# Patient Record
Sex: FEMALE | Race: WHITE | NOT HISPANIC OR LATINO | Employment: UNEMPLOYED | URBAN - NONMETROPOLITAN AREA
[De-identification: names, ages, dates, MRNs, and addresses within clinical notes are randomized per-mention and may not be internally consistent; named-entity substitution may affect disease eponyms.]

---

## 2024-09-04 ENCOUNTER — HOSPITAL ENCOUNTER (EMERGENCY)
Facility: HOSPITAL | Age: 64
Discharge: HOME | End: 2024-09-04
Payer: MEDICARE

## 2024-09-04 ENCOUNTER — APPOINTMENT (OUTPATIENT)
Dept: RADIOLOGY | Facility: HOSPITAL | Age: 64
End: 2024-09-04
Payer: MEDICARE

## 2024-09-04 ENCOUNTER — OFFICE VISIT (OUTPATIENT)
Dept: URGENT CARE | Facility: CLINIC | Age: 64
End: 2024-09-04
Payer: MEDICARE

## 2024-09-04 VITALS
RESPIRATION RATE: 18 BRPM | BODY MASS INDEX: 32.47 KG/M2 | TEMPERATURE: 98.6 F | HEART RATE: 74 BPM | HEIGHT: 61 IN | OXYGEN SATURATION: 95 % | SYSTOLIC BLOOD PRESSURE: 147 MMHG | DIASTOLIC BLOOD PRESSURE: 89 MMHG | WEIGHT: 172 LBS

## 2024-09-04 VITALS
HEART RATE: 76 BPM | WEIGHT: 172 LBS | OXYGEN SATURATION: 96 % | HEIGHT: 61 IN | SYSTOLIC BLOOD PRESSURE: 125 MMHG | BODY MASS INDEX: 32.47 KG/M2 | DIASTOLIC BLOOD PRESSURE: 73 MMHG | RESPIRATION RATE: 14 BRPM | TEMPERATURE: 98.1 F

## 2024-09-04 DIAGNOSIS — L03.116 CELLULITIS OF LEFT LOWER EXTREMITY: Primary | ICD-10-CM

## 2024-09-04 LAB — D DIMER PPP FEU-MCNC: 598 NG/ML FEU

## 2024-09-04 PROCEDURE — 99283 EMERGENCY DEPT VISIT LOW MDM: CPT

## 2024-09-04 PROCEDURE — 85379 FIBRIN DEGRADATION QUANT: CPT | Performed by: NURSE PRACTITIONER

## 2024-09-04 PROCEDURE — 73590 X-RAY EXAM OF LOWER LEG: CPT | Mod: LT

## 2024-09-04 PROCEDURE — 73590 X-RAY EXAM OF LOWER LEG: CPT | Mod: LEFT SIDE | Performed by: RADIOLOGY

## 2024-09-04 PROCEDURE — 36415 COLL VENOUS BLD VENIPUNCTURE: CPT | Performed by: NURSE PRACTITIONER

## 2024-09-04 RX ORDER — BISMUTH SUBSALICYLATE 262 MG
1 TABLET,CHEWABLE ORAL DAILY
COMMUNITY

## 2024-09-04 RX ORDER — CLINDAMYCIN HYDROCHLORIDE 300 MG/1
300 CAPSULE ORAL 4 TIMES DAILY
Qty: 28 CAPSULE | Refills: 0 | Status: SHIPPED | OUTPATIENT
Start: 2024-09-04 | End: 2024-09-11

## 2024-09-04 ASSESSMENT — PAIN - FUNCTIONAL ASSESSMENT: PAIN_FUNCTIONAL_ASSESSMENT: 0-10

## 2024-09-04 ASSESSMENT — COLUMBIA-SUICIDE SEVERITY RATING SCALE - C-SSRS
6. HAVE YOU EVER DONE ANYTHING, STARTED TO DO ANYTHING, OR PREPARED TO DO ANYTHING TO END YOUR LIFE?: NO
2. HAVE YOU ACTUALLY HAD ANY THOUGHTS OF KILLING YOURSELF?: NO
1. IN THE PAST MONTH, HAVE YOU WISHED YOU WERE DEAD OR WISHED YOU COULD GO TO SLEEP AND NOT WAKE UP?: NO

## 2024-09-04 ASSESSMENT — PAIN DESCRIPTION - LOCATION: LOCATION: LEG

## 2024-09-04 ASSESSMENT — PAIN DESCRIPTION - PAIN TYPE: TYPE: ACUTE PAIN

## 2024-09-04 ASSESSMENT — PAIN DESCRIPTION - ORIENTATION: ORIENTATION: LEFT;LOWER

## 2024-09-04 ASSESSMENT — PAIN SCALES - GENERAL: PAINLEVEL_OUTOF10: 5 - MODERATE PAIN

## 2024-09-04 NOTE — ED PROVIDER NOTES
Chief Complaint   Patient presents with    Leg Swelling     Female came to ED by POV with left lower leg swelling, she has pain when ambulating, 5/10. Came from the urgent care. Approximately 1 month ago she hit her leg with tire iron and about 3 days ago her left lower leg started swelling and having a lot of pain when ambulating. Upon exam it is swollen, red and warm to the touch.         Patient History    No past medical history on file.   No past surgical history on file.   No family history on file.   Social History     Social History Narrative    Not on file      No Known Allergies     PMH: Reviewed  PSH: Reviewed  Social History: Reviewed.   Allergies reviewed.     HPI: Belgica Pinto is a 64 y.o. female who presents to the ED today accompanied by her granddaughter with complaints of left leg pain and swelling.  About a month ago she struck that front of her right lower extremity with a tire iron.  It swelled initially but then went down.  Over the last 4 days she has not had increased pain and swelling along with redness.  She went to urgent care and was advised to come to the ED today.  Denies fevers or chills.  Denies chest pain or shortness of breath.  No history of PE or DVT.    PHYSICAL EXAM:    GENERAL: Vitals noted, no distress. Alert and oriented x 3. Non-toxic.       HEAD: Normocephalic, atraumatic. Pupils equally round and reactive to light. EOMI.     NECK: Supple. No midline or paraspinal tenderness through full range of motion.      CARDIAC: Regular rate, rhythm. No murmurs or rubs.    RESPIRATORY: Lungs clear and equal bilaterally. No respiratory distress.     MUSCULOSKELETAL & SKIN:  Warm, dry, and intact set for the patient's left lower extremity.  It is red, warm to the touch, localized swelling.  Distal cap refills less than 2 seconds.  Pedal pulse 2+ and bounding.  Knee with full range of motion and nontender. No rash/lesions. No other peripheral edema.     NEURO: No focal neurologic  deficits, acting appropriately.     Labs Reviewed   D-DIMER, NON VTE - Abnormal       Result Value    D-Dimer Non VTE, Quant (ng/mL FEU) 598 (*)     Narrative:     The D-Dimer assay is reported in ng/mL Fibrinogen Equivalent Units (FEU). The results of this assay should NOT be used for the exclusion of Deep Vein Thrombosis and/or Pulmonary Embolism.        XR tibia fibula left 2 views   Final Result   Focal soft tissue swelling in the anterior proximal calf        Signed by: Anastacia Chapa 9/4/2024 6:59 PM   Dictation workstation:   [id number]           Medical Decision Making         ED COURSE: This patient was seen and examined by myself independently.  Concern for DVT, however unfortunately ultrasound is not available at this time.  Patient has a D-dimer which is noted above at 598, negative for her age of 64.  X-ray of the left tib-fib shows focal soft tissue swelling the anterior proximal calf.  Given the redness and the swelling and the tenderness the patient will be started on antibiotics for acute cellulitis.  Referred to primary care physician for repeat evaluation follow-up care in the next 2 to 3 days.  Return to ED for any new or worsening symptoms.  Discharged home in a stable condition with computer instructions given.      DIAGNOSTIC IMPRESSION: #1 cellulitis left lower extremity     Riassa Dillard, RASHAUN-CNP  09/04/24 1930

## 2024-09-11 ENCOUNTER — APPOINTMENT (OUTPATIENT)
Dept: RADIOLOGY | Facility: HOSPITAL | Age: 64
DRG: 572 | End: 2024-09-11
Payer: MEDICARE

## 2024-09-11 ENCOUNTER — CLINICAL SUPPORT (OUTPATIENT)
Dept: URGENT CARE | Facility: CLINIC | Age: 64
End: 2024-09-11
Payer: MEDICARE

## 2024-09-11 ENCOUNTER — HOSPITAL ENCOUNTER (INPATIENT)
Facility: HOSPITAL | Age: 64
DRG: 572 | End: 2024-09-11
Attending: EMERGENCY MEDICINE | Admitting: SPECIALIST
Payer: MEDICARE

## 2024-09-11 ENCOUNTER — PREP FOR PROCEDURE (OUTPATIENT)
Dept: ORTHOPEDIC SURGERY | Facility: CLINIC | Age: 64
End: 2024-09-11

## 2024-09-11 ENCOUNTER — APPOINTMENT (OUTPATIENT)
Dept: VASCULAR MEDICINE | Facility: HOSPITAL | Age: 64
DRG: 572 | End: 2024-09-11
Payer: MEDICARE

## 2024-09-11 VITALS
HEIGHT: 61 IN | RESPIRATION RATE: 18 BRPM | HEART RATE: 67 BPM | SYSTOLIC BLOOD PRESSURE: 133 MMHG | BODY MASS INDEX: 32.47 KG/M2 | OXYGEN SATURATION: 97 % | DIASTOLIC BLOOD PRESSURE: 83 MMHG | WEIGHT: 172 LBS | TEMPERATURE: 97.4 F

## 2024-09-11 DIAGNOSIS — L03.116 CELLULITIS AND ABSCESS OF LEFT LOWER EXTREMITY: ICD-10-CM

## 2024-09-11 DIAGNOSIS — L02.416 ABSCESS OF LEFT LEG: Primary | ICD-10-CM

## 2024-09-11 DIAGNOSIS — M79.662 PAIN IN LEFT LOWER LEG: ICD-10-CM

## 2024-09-11 DIAGNOSIS — L02.416 CELLULITIS AND ABSCESS OF LEFT LOWER EXTREMITY: ICD-10-CM

## 2024-09-11 LAB
ALBUMIN SERPL BCP-MCNC: 3.9 G/DL (ref 3.4–5)
ALP SERPL-CCNC: 85 U/L (ref 33–136)
ALT SERPL W P-5'-P-CCNC: 20 U/L (ref 7–45)
ANION GAP SERPL CALC-SCNC: 12 MMOL/L (ref 10–20)
AST SERPL W P-5'-P-CCNC: 14 U/L (ref 9–39)
BASOPHILS # BLD AUTO: 0.1 X10*3/UL (ref 0–0.1)
BASOPHILS NFR BLD AUTO: 0.9 %
BILIRUB SERPL-MCNC: 0.5 MG/DL (ref 0–1.2)
BUN SERPL-MCNC: 11 MG/DL (ref 6–23)
CALCIUM SERPL-MCNC: 9.2 MG/DL (ref 8.6–10.3)
CHLORIDE SERPL-SCNC: 100 MMOL/L (ref 98–107)
CO2 SERPL-SCNC: 28 MMOL/L (ref 21–32)
CREAT SERPL-MCNC: 0.63 MG/DL (ref 0.5–1.05)
CRP SERPL-MCNC: 8.88 MG/DL
EGFRCR SERPLBLD CKD-EPI 2021: >90 ML/MIN/1.73M*2
EOSINOPHIL # BLD AUTO: 0.35 X10*3/UL (ref 0–0.7)
EOSINOPHIL NFR BLD AUTO: 3.1 %
ERYTHROCYTE [DISTWIDTH] IN BLOOD BY AUTOMATED COUNT: 12.8 % (ref 11.5–14.5)
ERYTHROCYTE [SEDIMENTATION RATE] IN BLOOD BY WESTERGREN METHOD: 79 MM/H (ref 0–30)
GLUCOSE SERPL-MCNC: 116 MG/DL (ref 74–99)
HCT VFR BLD AUTO: 38.1 % (ref 36–46)
HGB BLD-MCNC: 12.4 G/DL (ref 12–16)
IMM GRANULOCYTES # BLD AUTO: 0.23 X10*3/UL (ref 0–0.7)
IMM GRANULOCYTES NFR BLD AUTO: 2 % (ref 0–0.9)
LACTATE SERPL-SCNC: 0.6 MMOL/L (ref 0.4–2)
LYMPHOCYTES # BLD AUTO: 1.96 X10*3/UL (ref 1.2–4.8)
LYMPHOCYTES NFR BLD AUTO: 17.2 %
MCH RBC QN AUTO: 29.2 PG (ref 26–34)
MCHC RBC AUTO-ENTMCNC: 32.5 G/DL (ref 32–36)
MCV RBC AUTO: 90 FL (ref 80–100)
MONOCYTES # BLD AUTO: 1.05 X10*3/UL (ref 0.1–1)
MONOCYTES NFR BLD AUTO: 9.2 %
NEUTROPHILS # BLD AUTO: 7.68 X10*3/UL (ref 1.2–7.7)
NEUTROPHILS NFR BLD AUTO: 67.6 %
NRBC BLD-RTO: 0 /100 WBCS (ref 0–0)
PLATELET # BLD AUTO: 296 X10*3/UL (ref 150–450)
POTASSIUM SERPL-SCNC: 4 MMOL/L (ref 3.5–5.3)
PROT SERPL-MCNC: 7.5 G/DL (ref 6.4–8.2)
RBC # BLD AUTO: 4.25 X10*6/UL (ref 4–5.2)
SODIUM SERPL-SCNC: 136 MMOL/L (ref 136–145)
WBC # BLD AUTO: 11.4 X10*3/UL (ref 4.4–11.3)

## 2024-09-11 PROCEDURE — 2500000004 HC RX 250 GENERAL PHARMACY W/ HCPCS (ALT 636 FOR OP/ED): Performed by: HOSPITALIST

## 2024-09-11 PROCEDURE — 1100000001 HC PRIVATE ROOM DAILY

## 2024-09-11 PROCEDURE — 93971 EXTREMITY STUDY: CPT | Performed by: STUDENT IN AN ORGANIZED HEALTH CARE EDUCATION/TRAINING PROGRAM

## 2024-09-11 PROCEDURE — 93971 EXTREMITY STUDY: CPT

## 2024-09-11 PROCEDURE — 82565 ASSAY OF CREATININE: CPT | Performed by: EMERGENCY MEDICINE

## 2024-09-11 PROCEDURE — 85652 RBC SED RATE AUTOMATED: CPT | Performed by: EMERGENCY MEDICINE

## 2024-09-11 PROCEDURE — 86140 C-REACTIVE PROTEIN: CPT | Performed by: EMERGENCY MEDICINE

## 2024-09-11 PROCEDURE — 36415 COLL VENOUS BLD VENIPUNCTURE: CPT | Performed by: EMERGENCY MEDICINE

## 2024-09-11 PROCEDURE — 2500000004 HC RX 250 GENERAL PHARMACY W/ HCPCS (ALT 636 FOR OP/ED): Performed by: EMERGENCY MEDICINE

## 2024-09-11 PROCEDURE — 87040 BLOOD CULTURE FOR BACTERIA: CPT | Mod: SAMLAB | Performed by: EMERGENCY MEDICINE

## 2024-09-11 PROCEDURE — 96367 TX/PROPH/DG ADDL SEQ IV INF: CPT

## 2024-09-11 PROCEDURE — 83605 ASSAY OF LACTIC ACID: CPT | Performed by: EMERGENCY MEDICINE

## 2024-09-11 PROCEDURE — 85025 COMPLETE CBC W/AUTO DIFF WBC: CPT | Performed by: EMERGENCY MEDICINE

## 2024-09-11 PROCEDURE — 99223 1ST HOSP IP/OBS HIGH 75: CPT | Performed by: SPECIALIST

## 2024-09-11 PROCEDURE — 99222 1ST HOSP IP/OBS MODERATE 55: CPT | Performed by: HOSPITALIST

## 2024-09-11 PROCEDURE — 73701 CT LOWER EXTREMITY W/DYE: CPT | Mod: LEFT SIDE | Performed by: RADIOLOGY

## 2024-09-11 PROCEDURE — 2550000001 HC RX 255 CONTRASTS: Performed by: EMERGENCY MEDICINE

## 2024-09-11 PROCEDURE — 2500000001 HC RX 250 WO HCPCS SELF ADMINISTERED DRUGS (ALT 637 FOR MEDICARE OP): Performed by: HOSPITALIST

## 2024-09-11 PROCEDURE — 96365 THER/PROPH/DIAG IV INF INIT: CPT

## 2024-09-11 PROCEDURE — 96366 THER/PROPH/DIAG IV INF ADDON: CPT

## 2024-09-11 PROCEDURE — 73701 CT LOWER EXTREMITY W/DYE: CPT | Mod: LT

## 2024-09-11 PROCEDURE — 99285 EMERGENCY DEPT VISIT HI MDM: CPT | Mod: 25

## 2024-09-11 RX ORDER — BACITRACIN ZINC 500 UNIT/G
OINTMENT IN PACKET (EA) TOPICAL 3 TIMES DAILY
Status: DISCONTINUED | OUTPATIENT
Start: 2024-09-11 | End: 2024-09-13

## 2024-09-11 RX ORDER — ONDANSETRON 4 MG/1
4 TABLET, ORALLY DISINTEGRATING ORAL EVERY 8 HOURS PRN
Status: DISCONTINUED | OUTPATIENT
Start: 2024-09-11 | End: 2024-09-17 | Stop reason: HOSPADM

## 2024-09-11 RX ORDER — VANCOMYCIN HYDROCHLORIDE 1 G/20ML
INJECTION, POWDER, LYOPHILIZED, FOR SOLUTION INTRAVENOUS DAILY PRN
Status: DISCONTINUED | OUTPATIENT
Start: 2024-09-11 | End: 2024-09-17

## 2024-09-11 RX ORDER — POLYETHYLENE GLYCOL 3350 17 G/17G
17 POWDER, FOR SOLUTION ORAL DAILY
Status: DISCONTINUED | OUTPATIENT
Start: 2024-09-11 | End: 2024-09-17 | Stop reason: HOSPADM

## 2024-09-11 RX ORDER — ONDANSETRON HYDROCHLORIDE 2 MG/ML
4 INJECTION, SOLUTION INTRAVENOUS EVERY 8 HOURS PRN
Status: DISCONTINUED | OUTPATIENT
Start: 2024-09-11 | End: 2024-09-17 | Stop reason: HOSPADM

## 2024-09-11 RX ORDER — ACETAMINOPHEN 160 MG/5ML
650 SOLUTION ORAL EVERY 4 HOURS PRN
Status: DISCONTINUED | OUTPATIENT
Start: 2024-09-11 | End: 2024-09-17 | Stop reason: HOSPADM

## 2024-09-11 RX ORDER — VANCOMYCIN 1.75 G/350ML
1250 INJECTION, SOLUTION INTRAVENOUS EVERY 12 HOURS
Status: DISCONTINUED | OUTPATIENT
Start: 2024-09-12 | End: 2024-09-12

## 2024-09-11 RX ORDER — MORPHINE SULFATE 2 MG/ML
0.5 INJECTION, SOLUTION INTRAMUSCULAR; INTRAVENOUS EVERY 4 HOURS PRN
Status: DISCONTINUED | OUTPATIENT
Start: 2024-09-11 | End: 2024-09-17 | Stop reason: HOSPADM

## 2024-09-11 RX ORDER — SODIUM CHLORIDE, SODIUM LACTATE, POTASSIUM CHLORIDE, CALCIUM CHLORIDE 600; 310; 30; 20 MG/100ML; MG/100ML; MG/100ML; MG/100ML
20 INJECTION, SOLUTION INTRAVENOUS CONTINUOUS
Status: CANCELLED | OUTPATIENT
Start: 2024-09-11

## 2024-09-11 RX ORDER — ACETAMINOPHEN 650 MG/1
650 SUPPOSITORY RECTAL EVERY 4 HOURS PRN
Status: DISCONTINUED | OUTPATIENT
Start: 2024-09-11 | End: 2024-09-17 | Stop reason: HOSPADM

## 2024-09-11 RX ORDER — ACETAMINOPHEN 325 MG/1
650 TABLET ORAL EVERY 4 HOURS PRN
Status: DISCONTINUED | OUTPATIENT
Start: 2024-09-11 | End: 2024-09-17 | Stop reason: HOSPADM

## 2024-09-11 RX ORDER — VANCOMYCIN 2 GRAM/500 ML IN 0.9 % SODIUM CHLORIDE INTRAVENOUS
2 ONCE
Status: DISCONTINUED | OUTPATIENT
Start: 2024-09-11 | End: 2024-09-11

## 2024-09-11 RX ORDER — SODIUM CHLORIDE 9 MG/ML
150 INJECTION, SOLUTION INTRAVENOUS CONTINUOUS
Status: DISCONTINUED | OUTPATIENT
Start: 2024-09-11 | End: 2024-09-12

## 2024-09-11 RX ORDER — MORPHINE SULFATE 2 MG/ML
1 INJECTION, SOLUTION INTRAMUSCULAR; INTRAVENOUS EVERY 4 HOURS PRN
Status: DISCONTINUED | OUTPATIENT
Start: 2024-09-11 | End: 2024-09-17 | Stop reason: HOSPADM

## 2024-09-11 SDOH — SOCIAL STABILITY: SOCIAL INSECURITY: DO YOU FEEL ANYONE HAS EXPLOITED OR TAKEN ADVANTAGE OF YOU FINANCIALLY OR OF YOUR PERSONAL PROPERTY?: NO

## 2024-09-11 SDOH — SOCIAL STABILITY: SOCIAL INSECURITY: ARE THERE ANY APPARENT SIGNS OF INJURIES/BEHAVIORS THAT COULD BE RELATED TO ABUSE/NEGLECT?: NO

## 2024-09-11 SDOH — SOCIAL STABILITY: SOCIAL INSECURITY: WERE YOU ABLE TO COMPLETE ALL THE BEHAVIORAL HEALTH SCREENINGS?: YES

## 2024-09-11 SDOH — SOCIAL STABILITY: SOCIAL INSECURITY: ABUSE: ADULT

## 2024-09-11 SDOH — SOCIAL STABILITY: SOCIAL INSECURITY: HAVE YOU HAD THOUGHTS OF HARMING ANYONE ELSE?: NO

## 2024-09-11 SDOH — SOCIAL STABILITY: SOCIAL INSECURITY: ARE YOU OR HAVE YOU BEEN THREATENED OR ABUSED PHYSICALLY, EMOTIONALLY, OR SEXUALLY BY ANYONE?: NO

## 2024-09-11 SDOH — SOCIAL STABILITY: SOCIAL INSECURITY: HAVE YOU HAD ANY THOUGHTS OF HARMING ANYONE ELSE?: NO

## 2024-09-11 SDOH — SOCIAL STABILITY: SOCIAL INSECURITY: DO YOU FEEL UNSAFE GOING BACK TO THE PLACE WHERE YOU ARE LIVING?: NO

## 2024-09-11 SDOH — SOCIAL STABILITY: SOCIAL INSECURITY: DOES ANYONE TRY TO KEEP YOU FROM HAVING/CONTACTING OTHER FRIENDS OR DOING THINGS OUTSIDE YOUR HOME?: NO

## 2024-09-11 SDOH — SOCIAL STABILITY: SOCIAL INSECURITY: HAS ANYONE EVER THREATENED TO HURT YOUR FAMILY OR YOUR PETS?: NO

## 2024-09-11 ASSESSMENT — PATIENT HEALTH QUESTIONNAIRE - PHQ9
1. LITTLE INTEREST OR PLEASURE IN DOING THINGS: NOT AT ALL
2. FEELING DOWN, DEPRESSED OR HOPELESS: NOT AT ALL
SUM OF ALL RESPONSES TO PHQ9 QUESTIONS 1 & 2: 0

## 2024-09-11 ASSESSMENT — COGNITIVE AND FUNCTIONAL STATUS - GENERAL
MOBILITY SCORE: 24
DAILY ACTIVITIY SCORE: 24
MOBILITY SCORE: 24
MOBILITY SCORE: 24
DAILY ACTIVITIY SCORE: 24
DAILY ACTIVITIY SCORE: 24
PATIENT BASELINE BEDBOUND: NO

## 2024-09-11 ASSESSMENT — LIFESTYLE VARIABLES
HOW MANY STANDARD DRINKS CONTAINING ALCOHOL DO YOU HAVE ON A TYPICAL DAY: PATIENT DOES NOT DRINK
SUBSTANCE_ABUSE_PAST_12_MONTHS: NO
AUDIT-C TOTAL SCORE: 0
AUDIT-C TOTAL SCORE: 0
HOW OFTEN DO YOU HAVE 6 OR MORE DRINKS ON ONE OCCASION: NEVER
SKIP TO QUESTIONS 9-10: 1
PRESCIPTION_ABUSE_PAST_12_MONTHS: NO
HOW OFTEN DO YOU HAVE A DRINK CONTAINING ALCOHOL: NEVER

## 2024-09-11 ASSESSMENT — COLUMBIA-SUICIDE SEVERITY RATING SCALE - C-SSRS
2. HAVE YOU ACTUALLY HAD ANY THOUGHTS OF KILLING YOURSELF?: NO
1. IN THE PAST MONTH, HAVE YOU WISHED YOU WERE DEAD OR WISHED YOU COULD GO TO SLEEP AND NOT WAKE UP?: NO
2. HAVE YOU ACTUALLY HAD ANY THOUGHTS OF KILLING YOURSELF?: NO
6. HAVE YOU EVER DONE ANYTHING, STARTED TO DO ANYTHING, OR PREPARED TO DO ANYTHING TO END YOUR LIFE?: NO
1. IN THE PAST MONTH, HAVE YOU WISHED YOU WERE DEAD OR WISHED YOU COULD GO TO SLEEP AND NOT WAKE UP?: NO
6. HAVE YOU EVER DONE ANYTHING, STARTED TO DO ANYTHING, OR PREPARED TO DO ANYTHING TO END YOUR LIFE?: NO

## 2024-09-11 ASSESSMENT — PAIN SCALES - GENERAL
PAINLEVEL_OUTOF10: 5 - MODERATE PAIN
PAINLEVEL_OUTOF10: 0 - NO PAIN

## 2024-09-11 ASSESSMENT — PAIN - FUNCTIONAL ASSESSMENT
PAIN_FUNCTIONAL_ASSESSMENT: 0-10

## 2024-09-11 ASSESSMENT — ACTIVITIES OF DAILY LIVING (ADL)
HEARING - LEFT EAR: FUNCTIONAL
BATHING: INDEPENDENT
FEEDING YOURSELF: INDEPENDENT
TOILETING: INDEPENDENT
PATIENT'S MEMORY ADEQUATE TO SAFELY COMPLETE DAILY ACTIVITIES?: YES
JUDGMENT_ADEQUATE_SAFELY_COMPLETE_DAILY_ACTIVITIES: YES
WALKS IN HOME: INDEPENDENT
LACK_OF_TRANSPORTATION: NO
HEARING - RIGHT EAR: FUNCTIONAL
DRESSING YOURSELF: INDEPENDENT
ADEQUATE_TO_COMPLETE_ADL: YES
GROOMING: INDEPENDENT

## 2024-09-11 ASSESSMENT — VISUAL ACUITY: OU: 1

## 2024-09-11 NOTE — CARE PLAN
Problem: Skin  Goal: Decreased wound size/increased tissue granulation at next dressing change  Outcome: Progressing  Flowsheets (Taken 9/11/2024 1726)  Decreased wound size/increased tissue granulation at next dressing change: Promote sleep for wound healing  Goal: Participates in plan/prevention/treatment measures  Outcome: Progressing  Flowsheets (Taken 9/11/2024 1726)  Participates in plan/prevention/treatment measures: Increase activity/out of bed for meals  Goal: Promote/optimize nutrition  Outcome: Progressing  Flowsheets (Taken 9/11/2024 1726)  Promote/optimize nutrition: Consume > 50% meals/supplements  Goal: Promote skin healing  Outcome: Progressing  Flowsheets (Taken 9/11/2024 1726)  Promote skin healing: Turn/reposition every 2 hours/use positioning/transfer devices     Problem: Pain - Adult  Goal: Verbalizes/displays adequate comfort level or baseline comfort level  Outcome: Progressing     Problem: Safety - Adult  Goal: Free from fall injury  Outcome: Progressing     Problem: Discharge Planning  Goal: Discharge to home or other facility with appropriate resources  Outcome: Progressing     Problem: Chronic Conditions and Co-morbidities  Goal: Patient's chronic conditions and co-morbidity symptoms are monitored and maintained or improved  Outcome: Progressing     Problem: Pain  Goal: Takes deep breaths with improved pain control throughout the shift  Outcome: Progressing  Goal: Turns in bed with improved pain control throughout the shift  Outcome: Progressing  Goal: Walks with improved pain control throughout the shift  Outcome: Progressing  Goal: Performs ADL's with improved pain control throughout shift  Outcome: Progressing  Goal: Participates in PT with improved pain control throughout the shift  Outcome: Progressing  Goal: Free from opioid side effects throughout the shift  Outcome: Progressing  Goal: Free from acute confusion related to pain meds throughout the shift  Outcome: Progressing   The  patient's goals for the shift include no falls    The clinical goals for the shift include less swelling    Over the shift, the patient did not make progress toward the following goals. Barriers to progression include . Recommendations to address these barriers include .

## 2024-09-11 NOTE — ED PROVIDER NOTES
Infection of left leg.  This 64-year-old white female presents to the ED with complaint of infection of her left leg she states that she just finished a weeks worth of oral clindamycin for treatment of her cellulitis.  Patient does admit to a remote history of traumatic injury to her anterior tibia approxi a month ago with a tire iron.  She states that she then reinjured her leg when going up and down a ladder and noted infection that started Tuesday.  On Wednesday she was seen and evaluated in ED and started on clindamycin.  She states that some of the areas of erythema have improved but she has noted increasing swelling just below the tibial tuberosity across the anterior tibia with pain into the left calf.      History provided by:  Patient   used: No         Physical Exam  Vitals and nursing note reviewed.   Constitutional:       Appearance: Normal appearance. She is overweight.   HENT:      Head: Normocephalic and atraumatic.      Right Ear: Hearing and external ear normal.      Left Ear: Hearing and external ear normal.      Nose: Nose normal. No congestion or rhinorrhea.      Mouth/Throat:      Lips: Pink.      Mouth: Mucous membranes are moist.      Pharynx: Oropharynx is clear. No oropharyngeal exudate or posterior oropharyngeal erythema.   Eyes:      General: Lids are normal. Vision grossly intact.         Right eye: No discharge.         Left eye: No discharge.      Extraocular Movements: Extraocular movements intact.      Conjunctiva/sclera: Conjunctivae normal.      Pupils: Pupils are equal, round, and reactive to light.   Cardiovascular:      Rate and Rhythm: Normal rate and regular rhythm.      Pulses: Normal pulses.      Heart sounds: Normal heart sounds. No murmur heard.     No friction rub. No gallop.   Pulmonary:      Effort: Pulmonary effort is normal. No respiratory distress.      Breath sounds: Normal breath sounds. No stridor. No wheezing, rhonchi or rales.   Chest:       Chest wall: No tenderness.   Abdominal:      General: Abdomen is flat. Bowel sounds are normal. There is no distension.      Palpations: Abdomen is soft. There is no mass.      Tenderness: There is no abdominal tenderness. There is no guarding or rebound.      Hernia: No hernia is present.      Comments: Benign abdominal exam.   Musculoskeletal:         General: No swelling, deformity or signs of injury. Normal range of motion.      Cervical back: Full passive range of motion without pain, normal range of motion and neck supple.      Right lower leg: Normal. No swelling. No edema.      Left lower leg: Swelling and tenderness present. Edema present.      Comments: Evaluation of the left lower extremity revealed distal pulses are +2/4 and present at the dorsalis pedis and tibialis.  Cap refill less than 2 seconds and light touch sensations intact.  Patient does have diffuse erythema of much of the anterior tibia there is some fullness to the soft tissues underneath the skin though no fluctuance is noted.  There is some mild erythema above the knee but the patient states that this is improved.  There is no streaks of erythema appreciated.  Patient does have tenderness of the left calf.   Skin:     General: Skin is warm and dry.      Capillary Refill: Capillary refill takes less than 2 seconds.      Coloration: Skin is not jaundiced or pale.      Findings: Erythema and rash present. No bruising or lesion.   Neurological:      General: No focal deficit present.      Mental Status: She is alert and oriented to person, place, and time.      GCS: GCS eye subscore is 4. GCS verbal subscore is 5. GCS motor subscore is 6.      Cranial Nerves: Cranial nerves 2-12 are intact. No cranial nerve deficit.      Sensory: Sensation is intact. No sensory deficit.      Motor: No weakness.      Coordination: Coordination is intact. Coordination normal.      Deep Tendon Reflexes: Reflexes normal.   Psychiatric:         Attention and  Perception: Attention and perception normal.         Mood and Affect: Mood normal.         Speech: Speech normal.         Behavior: Behavior normal. Behavior is cooperative.         Thought Content: Thought content normal.         Cognition and Memory: Cognition and memory normal.         Judgment: Judgment normal.          Labs Reviewed   TISSUE/WOUND CULTURE/SMEAR - Abnormal       Result Value    Tissue/Wound Culture/Smear   (*)     Value: (4+) Abundant Methicillin Resistant Staphylococcus aureus (MRSA)    Gram Stain (2+) Few Polymorphonuclear leukocytes (*)     Gram Stain (2+) Few Gram positive cocci (*)    TISSUE/WOUND CULTURE/SMEAR - Abnormal    Tissue/Wound Culture/Smear (1+) Rare Staphylococcus aureus (*)     Gram Stain (2+) Few Polymorphonuclear leukocytes      Gram Stain No organisms seen     COMPREHENSIVE METABOLIC PANEL - Abnormal    Glucose 116 (*)     Sodium 136      Potassium 4.0      Chloride 100      Bicarbonate 28      Anion Gap 12      Urea Nitrogen 11      Creatinine 0.63      eGFR >90      Calcium 9.2      Albumin 3.9      Alkaline Phosphatase 85      Total Protein 7.5      AST 14      Bilirubin, Total 0.5      ALT 20     CBC WITH AUTO DIFFERENTIAL - Abnormal    WBC 11.4 (*)     nRBC 0.0      RBC 4.25      Hemoglobin 12.4      Hematocrit 38.1      MCV 90      MCH 29.2      MCHC 32.5      RDW 12.8      Platelets 296      Neutrophils % 67.6      Immature Granulocytes %, Automated 2.0 (*)     Lymphocytes % 17.2      Monocytes % 9.2      Eosinophils % 3.1      Basophils % 0.9      Neutrophils Absolute 7.68      Immature Granulocytes Absolute, Automated 0.23      Lymphocytes Absolute 1.96      Monocytes Absolute 1.05 (*)     Eosinophils Absolute 0.35      Basophils Absolute 0.10     SEDIMENTATION RATE, AUTOMATED - Abnormal    Sedimentation Rate 79 (*)    C-REACTIVE PROTEIN - Abnormal    C-Reactive Protein 8.88 (*)    CBC - Abnormal    WBC 9.0      nRBC 0.0      RBC 3.57 (*)     Hemoglobin 10.5 (*)      Hematocrit 32.5 (*)     MCV 91      MCH 29.4      MCHC 32.3      RDW 12.9      Platelets 277     COMPREHENSIVE METABOLIC PANEL - Abnormal    Glucose 102 (*)     Sodium 139      Potassium 4.0      Chloride 106      Bicarbonate 27      Anion Gap 10      Urea Nitrogen 10      Creatinine 0.70      eGFR >90      Calcium 8.3 (*)     Albumin 3.1 (*)     Alkaline Phosphatase 66      Total Protein 6.1 (*)     AST 10      Bilirubin, Total 0.4      ALT 13     VANCOMYCIN, TROUGH - Abnormal    Vancomycin, Trough 21.7 (*)    VANCOMYCIN - Abnormal    Vancomycin 22.5 (*)     Narrative:     Vancomycin levels can be monitored according to area under the curve (AUC) or concentration (ug/mL). The preferred monitoring strategy is determined by the patient's renal function and indication for therapy.     For AUC monitoring, a random vancomycin level should be interpreted in the context of AUC rather than the concentration at a single point in time.    For concentration monitoring, a trough concentration drawn immediately prior to the next dose is preferred.       Therapeutic ranges using concentration-guided results:  Peak (all ages):                  30.0-40.0 ug/mL  Trough (all ages):                10.0-20.0 ug/mL              CBC - Abnormal    WBC 11.1      nRBC 0.0      RBC 3.87 (*)     Hemoglobin 11.3 (*)     Hematocrit 34.8 (*)     MCV 90      MCH 29.2      MCHC 32.5      RDW 12.9      Platelets 333     BASIC METABOLIC PANEL - Abnormal    Glucose 112 (*)     Sodium 135 (*)     Potassium 3.8      Chloride 103      Bicarbonate 25      Anion Gap 11      Urea Nitrogen 12      Creatinine 0.74      eGFR 90      Calcium 8.6     CBC - Abnormal    WBC 10.5      nRBC 0.0      RBC 3.77 (*)     Hemoglobin 11.1 (*)     Hematocrit 34.4 (*)     MCV 91      MCH 29.4      MCHC 32.3      RDW 13.2      Platelets 311     BASIC METABOLIC PANEL - Abnormal    Glucose 89      Sodium 141      Potassium 3.8      Chloride 107      Bicarbonate 26      Anion  Gap 12      Urea Nitrogen 17      Creatinine 0.88      eGFR 73      Calcium 8.5 (*)    C-REACTIVE PROTEIN - Abnormal    C-Reactive Protein 2.61 (*)    SEDIMENTATION RATE, AUTOMATED - Abnormal    Sedimentation Rate 54 (*)    VANCOMYCIN - Abnormal    Vancomycin 38.3 (*)     Narrative:     Vancomycin levels can be monitored according to area under the curve (AUC) or concentration (ug/mL). The preferred monitoring strategy is determined by the patient's renal function and indication for therapy.     For AUC monitoring, a random vancomycin level should be interpreted in the context of AUC rather than the concentration at a single point in time.    For concentration monitoring, a trough concentration drawn immediately prior to the next dose is preferred.       Therapeutic ranges using concentration-guided results:  Peak (all ages):                  30.0-40.0 ug/mL  Trough (all ages):                10.0-20.0 ug/mL              VANCOMYCIN - Abnormal    Vancomycin 24.7 (*)     Narrative:     Vancomycin levels can be monitored according to area under the curve (AUC) or concentration (ug/mL). The preferred monitoring strategy is determined by the patient's renal function and indication for therapy.     For AUC monitoring, a random vancomycin level should be interpreted in the context of AUC rather than the concentration at a single point in time.    For concentration monitoring, a trough concentration drawn immediately prior to the next dose is preferred.       Therapeutic ranges using concentration-guided results:  Peak (all ages):                  30.0-40.0 ug/mL  Trough (all ages):                10.0-20.0 ug/mL              CBC - Abnormal    WBC 11.5 (*)     nRBC 0.0      RBC 3.83 (*)     Hemoglobin 11.1 (*)     Hematocrit 34.8 (*)     MCV 91      MCH 29.0      MCHC 31.9 (*)     RDW 13.1      Platelets 297     BASIC METABOLIC PANEL - Abnormal    Glucose 117 (*)     Sodium 138      Potassium 3.9      Chloride 105       Bicarbonate 24      Anion Gap 13      Urea Nitrogen 15      Creatinine 0.68      eGFR >90      Calcium 8.8     BLOOD CULTURE - Normal    Blood Culture No growth at 4 days -  FINAL REPORT     BLOOD CULTURE - Normal    Blood Culture No growth at 4 days -  FINAL REPORT     LACTATE - Normal    Lactate 0.6      Narrative:     Venipuncture immediately after or during the administration of Metamizole may lead to falsely low results. Testing should be performed immediately  prior to Metamizole dosing.   FUNGAL CULTURE/SMEAR    Fungal Culture/Smear        Value: Culture in progress, a report will be issued when positive or after 2 weeks of incubation.    Fungal Smear No fungal elements seen     FUNGAL CULTURE/SMEAR    Fungal Culture/Smear        Value: Culture in progress, a report will be issued when positive or after 2 weeks of incubation.    Fungal Smear No fungal elements seen     TISSUE/WOUND CULTURE/SMEAR   TYPE AND SCREEN    ABO TYPE O      Rh TYPE POS      ANTIBODY SCREEN NEG          CT lower extremity left w IV contrast   Final Result   1. Pretibial subcutaneous fluid collection from the level of the   tibial tuberosity extending distally measuring 6.9 cm in the   craniocaudal dimension with abscess formation of concern. No foci of   gas within the collection of the surrounding subcutaneous edema   demonstrated. This suggests underlying cellulitis. Correlate with   focal ulceration and fluid leaking from the collection.        2. Cellulitis within the calf with edema tracking along superficial   fascial plane of the proximal to mid calf.        3. No infectious myositis.             MACRO:   None        Signed by: Yenni Zhang 9/11/2024 1:55 PM   Dictation workstation:   [id number]      Lower extremity venous duplex left   Final Result           Procedures     Medical Decision Making  Patient was seen and evaluated in the ED due to complaint of infection of her left leg.  She states that she just finished a  weeks worth of oral clindamycin for treatment of her cellulitis when her leg did get somewhat improved but the area below the tibial tuberosity is more painful swollen and erythematous.  Clinically she has evidence of a early abscess in her anterior tibia.  Patient was ordered blood work which revealed a sed rate of 79 lactic acid was normal at 0.6.  C-reactive protein was elevated at 888.  CMP was unremarkable except for glucose of 116 white cell count was 11.4.  I did do a venous duplex Doppler of the lower extremity and this revealed no evidence of deep vein thrombosis.  CT scan imaging of the left lower extremity with IV contrast reveals pretibial subcutaneous fluid collection from the level of the tibial tuberosity extending distally measuring 6.9 cm in craniocaudal dimension with abscess formation of concern no foci of gas within the collection of the surrounding subcutaneous edema demonstrated.  This suggest underlying cellulitis correlate with focal ulceration and fluid leaking from the collection.  Cellulitis within the calf with edema tracking along superficial fascial plane of the proximal to mid calf no infectious myositis.  The patient was started on broad-spectrum IV antibiotics I did consult with podiatry prior to admitting the patient to the hospital for further IV antibiotics and possible I&D of this abscess.         Diagnoses as of 09/16/24 0809   Cellulitis and abscess of left lower extremity   Abscess of left leg                    Edi Serrano, DO  09/16/24 0810

## 2024-09-11 NOTE — CONSULTS
"Reason For Consult  Left anterior medial leg cellulitis with subcutaneous abscess.    History Of Present Illness  Belgica Pinto is a 64 y.o. female presenting with cellulitis and subQ abcess.   She hit her leg in this area with a tire iron.  Was in ER earlier and placed on ATBs.  Some of the cellulitis regressed but the anterior medial calf has become fluctuant with erythema and calor.  She has local pain but no constitutional symptoms. No malaise or fever or chills.    She ate a hamburger in the ER about an hour ago.  Past Medical History  She has no past medical history on file.    Surgical History  She has no past surgical history on file.     Social History  She reports that she has never smoked. She has never used smokeless tobacco. She reports that she does not drink alcohol and does not use drugs.    Family History  No family history on file.     Allergies  Patient has no known allergies.    Review of Systems  See medical admission     Physical Exam  Left leg  Anterior medial just distal to tibial tubercle area of redness and calor.   Fluctuance in this area.  This is mildly tender to palpation   CT scan shows this superficial to the fascia.  No ascending lymphangitis  Calf is supple and min tender.         Last Recorded Vitals  Blood pressure 125/57, pulse 73, temperature 36.4 °C (97.6 °F), temperature source Temporal, resp. rate 18, height 1.549 m (5' 1\"), weight 77.1 kg (170 lb), SpO2 96%.    Relevant Results      Scheduled medications     Continuous medications  sodium chloride 0.9%, 150 mL/hr, Last Rate: 150 mL/hr (09/11/24 1016)      PRN medications    Results for orders placed or performed during the hospital encounter of 09/11/24 (from the past 24 hour(s))   Comprehensive Metabolic Panel   Result Value Ref Range    Glucose 116 (H) 74 - 99 mg/dL    Sodium 136 136 - 145 mmol/L    Potassium 4.0 3.5 - 5.3 mmol/L    Chloride 100 98 - 107 mmol/L    Bicarbonate 28 21 - 32 mmol/L    Anion Gap 12 10 - 20 " mmol/L    Urea Nitrogen 11 6 - 23 mg/dL    Creatinine 0.63 0.50 - 1.05 mg/dL    eGFR >90 >60 mL/min/1.73m*2    Calcium 9.2 8.6 - 10.3 mg/dL    Albumin 3.9 3.4 - 5.0 g/dL    Alkaline Phosphatase 85 33 - 136 U/L    Total Protein 7.5 6.4 - 8.2 g/dL    AST 14 9 - 39 U/L    Bilirubin, Total 0.5 0.0 - 1.2 mg/dL    ALT 20 7 - 45 U/L   CBC and Auto Differential   Result Value Ref Range    WBC 11.4 (H) 4.4 - 11.3 x10*3/uL    nRBC 0.0 0.0 - 0.0 /100 WBCs    RBC 4.25 4.00 - 5.20 x10*6/uL    Hemoglobin 12.4 12.0 - 16.0 g/dL    Hematocrit 38.1 36.0 - 46.0 %    MCV 90 80 - 100 fL    MCH 29.2 26.0 - 34.0 pg    MCHC 32.5 32.0 - 36.0 g/dL    RDW 12.8 11.5 - 14.5 %    Platelets 296 150 - 450 x10*3/uL    Neutrophils % 67.6 40.0 - 80.0 %    Immature Granulocytes %, Automated 2.0 (H) 0.0 - 0.9 %    Lymphocytes % 17.2 13.0 - 44.0 %    Monocytes % 9.2 2.0 - 10.0 %    Eosinophils % 3.1 0.0 - 6.0 %    Basophils % 0.9 0.0 - 2.0 %    Neutrophils Absolute 7.68 1.20 - 7.70 x10*3/uL    Immature Granulocytes Absolute, Automated 0.23 0.00 - 0.70 x10*3/uL    Lymphocytes Absolute 1.96 1.20 - 4.80 x10*3/uL    Monocytes Absolute 1.05 (H) 0.10 - 1.00 x10*3/uL    Eosinophils Absolute 0.35 0.00 - 0.70 x10*3/uL    Basophils Absolute 0.10 0.00 - 0.10 x10*3/uL   Lactate   Result Value Ref Range    Lactate 0.6 0.4 - 2.0 mmol/L   Sedimentation Rate   Result Value Ref Range    Sedimentation Rate 79 (H) 0 - 30 mm/h   C-Reactive Protein   Result Value Ref Range    C-Reactive Protein 8.88 (H) <1.00 mg/dL        Assessment/Plan     Left anterior medial leg with cellulitis and abscess as seen on CT scan   This likely represents a hematoma that may have gotten superinfected.    Since she eat a hamburger and soda will make her NPO after MN and proceed with I+D in am as soon as OR is available.    Ramone Cedeño MD

## 2024-09-11 NOTE — CONSULTS
Vancomycin Dosing by Pharmacy- INITIAL    Belgica Pinto is a 64 y.o. year old female who Pharmacy has been consulted for vancomycin dosing for cellulitis, skin and soft tissue. Based on the patient's indication and renal status this patient will be dosed based on a goal AUC of 400-600.     Renal function is currently stable.    Visit Vitals  /72 (BP Location: Left arm, Patient Position: Lying)   Pulse 68   Temp 36.6 °C (97.8 °F) (Temporal)   Resp 18        Lab Results   Component Value Date    CREATININE 0.63 2024        Patient weight is as follows:   Vitals:    24 1709   Weight: 74.9 kg (165 lb 2 oz)       Cultures:  No results found for the encounter in last 14 days.        No intake/output data recorded.  I/O during current shift:  I/O this shift:  In: 550 [IV Piggyback:550]  Out: -     Temp (24hrs), Av.4 °C (97.6 °F), Min:36.3 °C (97.4 °F), Max:36.6 °C (97.8 °F)         Assessment/Plan     Patient has already been given a loading dose of 1500 mg.  Will initiate vancomycin maintenance,  1250 mg every 12 hours.    This dosing regimen is predicted by [employer] to result in the following pharmacokinetic parameters:  Loading dose: 1500 mg  Regimen: 1250 mg IV every 12 hours.  Start time: 00:00 on 2024  Exposure target: AUC24 (range)400-600 mg/L.hr   [id number]: 463 mg/L.hr  AUC24,ss: 507 mg/L.hr  Probability of AUC24 > 400: 78 %  Ctrough,ss: 12.7 mg/L  Probability of Ctrough,ss > 20: 14 %    Follow-up level will be ordered on  at 0500 unless clinically indicated sooner.  Will continue to monitor renal function daily while on vancomycin and order serum creatinine at least every 48 hours if not already ordered.  Follow for continued vancomycin needs, clinical response, and signs/symptoms of toxicity.        A Friday Tarah De Souza

## 2024-09-12 ENCOUNTER — ANESTHESIA (OUTPATIENT)
Dept: OPERATING ROOM | Facility: HOSPITAL | Age: 64
End: 2024-09-12
Payer: MEDICARE

## 2024-09-12 ENCOUNTER — ANESTHESIA EVENT (OUTPATIENT)
Dept: OPERATING ROOM | Facility: HOSPITAL | Age: 64
End: 2024-09-12
Payer: MEDICARE

## 2024-09-12 ENCOUNTER — PREP FOR PROCEDURE (OUTPATIENT)
Dept: ORTHOPEDIC SURGERY | Facility: CLINIC | Age: 64
End: 2024-09-12
Payer: MEDICARE

## 2024-09-12 LAB
ALBUMIN SERPL BCP-MCNC: 3.1 G/DL (ref 3.4–5)
ALP SERPL-CCNC: 66 U/L (ref 33–136)
ALT SERPL W P-5'-P-CCNC: 13 U/L (ref 7–45)
ANION GAP SERPL CALC-SCNC: 10 MMOL/L (ref 10–20)
AST SERPL W P-5'-P-CCNC: 10 U/L (ref 9–39)
BILIRUB SERPL-MCNC: 0.4 MG/DL (ref 0–1.2)
BUN SERPL-MCNC: 10 MG/DL (ref 6–23)
CALCIUM SERPL-MCNC: 8.3 MG/DL (ref 8.6–10.3)
CHLORIDE SERPL-SCNC: 106 MMOL/L (ref 98–107)
CO2 SERPL-SCNC: 27 MMOL/L (ref 21–32)
CREAT SERPL-MCNC: 0.7 MG/DL (ref 0.5–1.05)
EGFRCR SERPLBLD CKD-EPI 2021: >90 ML/MIN/1.73M*2
ERYTHROCYTE [DISTWIDTH] IN BLOOD BY AUTOMATED COUNT: 12.9 % (ref 11.5–14.5)
GLUCOSE SERPL-MCNC: 102 MG/DL (ref 74–99)
HCT VFR BLD AUTO: 32.5 % (ref 36–46)
HGB BLD-MCNC: 10.5 G/DL (ref 12–16)
MCH RBC QN AUTO: 29.4 PG (ref 26–34)
MCHC RBC AUTO-ENTMCNC: 32.3 G/DL (ref 32–36)
MCV RBC AUTO: 91 FL (ref 80–100)
NRBC BLD-RTO: 0 /100 WBCS (ref 0–0)
PLATELET # BLD AUTO: 277 X10*3/UL (ref 150–450)
POTASSIUM SERPL-SCNC: 4 MMOL/L (ref 3.5–5.3)
PROT SERPL-MCNC: 6.1 G/DL (ref 6.4–8.2)
RBC # BLD AUTO: 3.57 X10*6/UL (ref 4–5.2)
SODIUM SERPL-SCNC: 139 MMOL/L (ref 136–145)
VANCOMYCIN TROUGH SERPL-MCNC: 21.7 UG/ML (ref 5–20)
WBC # BLD AUTO: 9 X10*3/UL (ref 4.4–11.3)

## 2024-09-12 PROCEDURE — 3600000002 HC OR TIME - INITIAL BASE CHARGE - PROCEDURE LEVEL TWO: Performed by: SPECIALIST

## 2024-09-12 PROCEDURE — 3700000001 HC GENERAL ANESTHESIA TIME - INITIAL BASE CHARGE: Performed by: SPECIALIST

## 2024-09-12 PROCEDURE — 7100000001 HC RECOVERY ROOM TIME - INITIAL BASE CHARGE: Performed by: SPECIALIST

## 2024-09-12 PROCEDURE — 2500000005 HC RX 250 GENERAL PHARMACY W/O HCPCS: Performed by: ANESTHESIOLOGY

## 2024-09-12 PROCEDURE — 80202 ASSAY OF VANCOMYCIN: CPT | Performed by: HOSPITALIST

## 2024-09-12 PROCEDURE — 84075 ASSAY ALKALINE PHOSPHATASE: CPT | Performed by: HOSPITALIST

## 2024-09-12 PROCEDURE — 36415 COLL VENOUS BLD VENIPUNCTURE: CPT | Performed by: HOSPITALIST

## 2024-09-12 PROCEDURE — 0JBP0ZZ EXCISION OF LEFT LOWER LEG SUBCUTANEOUS TISSUE AND FASCIA, OPEN APPROACH: ICD-10-PCS | Performed by: SPECIALIST

## 2024-09-12 PROCEDURE — 2500000005 HC RX 250 GENERAL PHARMACY W/O HCPCS: Performed by: SPECIALIST

## 2024-09-12 PROCEDURE — 85027 COMPLETE CBC AUTOMATED: CPT | Performed by: HOSPITALIST

## 2024-09-12 PROCEDURE — 2500000004 HC RX 250 GENERAL PHARMACY W/ HCPCS (ALT 636 FOR OP/ED): Performed by: SPECIALIST

## 2024-09-12 PROCEDURE — 87102 FUNGUS ISOLATION CULTURE: CPT | Mod: SAMLAB | Performed by: SPECIALIST

## 2024-09-12 PROCEDURE — 87077 CULTURE AEROBIC IDENTIFY: CPT | Mod: SAMLAB | Performed by: SPECIALIST

## 2024-09-12 PROCEDURE — 99233 SBSQ HOSP IP/OBS HIGH 50: CPT | Performed by: HOSPITALIST

## 2024-09-12 PROCEDURE — 1100000001 HC PRIVATE ROOM DAILY

## 2024-09-12 PROCEDURE — 2500000004 HC RX 250 GENERAL PHARMACY W/ HCPCS (ALT 636 FOR OP/ED): Performed by: HOSPITALIST

## 2024-09-12 PROCEDURE — 2500000004 HC RX 250 GENERAL PHARMACY W/ HCPCS (ALT 636 FOR OP/ED): Performed by: PHARMACIST

## 2024-09-12 PROCEDURE — 2500000004 HC RX 250 GENERAL PHARMACY W/ HCPCS (ALT 636 FOR OP/ED): Performed by: ANESTHESIOLOGY

## 2024-09-12 PROCEDURE — 3600000007 HC OR TIME - EACH INCREMENTAL 1 MINUTE - PROCEDURE LEVEL TWO: Performed by: SPECIALIST

## 2024-09-12 PROCEDURE — 99231 SBSQ HOSP IP/OBS SF/LOW 25: CPT | Performed by: NURSE PRACTITIONER

## 2024-09-12 PROCEDURE — 3700000002 HC GENERAL ANESTHESIA TIME - EACH INCREMENTAL 1 MINUTE: Performed by: SPECIALIST

## 2024-09-12 PROCEDURE — 7100000002 HC RECOVERY ROOM TIME - EACH INCREMENTAL 1 MINUTE: Performed by: SPECIALIST

## 2024-09-12 PROCEDURE — 2500000001 HC RX 250 WO HCPCS SELF ADMINISTERED DRUGS (ALT 637 FOR MEDICARE OP): Performed by: HOSPITALIST

## 2024-09-12 PROCEDURE — 2720000007 HC OR 272 NO HCPCS: Performed by: SPECIALIST

## 2024-09-12 PROCEDURE — 10061 I&D ABSCESS COMP/MULTIPLE: CPT | Performed by: SPECIALIST

## 2024-09-12 RX ORDER — KETOROLAC TROMETHAMINE 30 MG/ML
INJECTION, SOLUTION INTRAMUSCULAR; INTRAVENOUS AS NEEDED
Status: DISCONTINUED | OUTPATIENT
Start: 2024-09-12 | End: 2024-09-12

## 2024-09-12 RX ORDER — SODIUM CHLORIDE, SODIUM LACTATE, POTASSIUM CHLORIDE, CALCIUM CHLORIDE 600; 310; 30; 20 MG/100ML; MG/100ML; MG/100ML; MG/100ML
100 INJECTION, SOLUTION INTRAVENOUS CONTINUOUS
Status: DISCONTINUED | OUTPATIENT
Start: 2024-09-12 | End: 2024-09-13

## 2024-09-12 RX ORDER — PROPOFOL 10 MG/ML
INJECTION, EMULSION INTRAVENOUS AS NEEDED
Status: DISCONTINUED | OUTPATIENT
Start: 2024-09-12 | End: 2024-09-12

## 2024-09-12 RX ORDER — VANCOMYCIN HYDROCHLORIDE 1 G/200ML
1000 INJECTION, SOLUTION INTRAVENOUS EVERY 12 HOURS
Status: DISCONTINUED | OUTPATIENT
Start: 2024-09-12 | End: 2024-09-14

## 2024-09-12 RX ORDER — ONDANSETRON HYDROCHLORIDE 2 MG/ML
4 INJECTION, SOLUTION INTRAVENOUS ONCE AS NEEDED
Status: CANCELLED | OUTPATIENT
Start: 2024-09-12

## 2024-09-12 RX ORDER — MIDAZOLAM HYDROCHLORIDE 1 MG/ML
2 INJECTION INTRAMUSCULAR; INTRAVENOUS ONCE AS NEEDED
Status: COMPLETED | OUTPATIENT
Start: 2024-09-12 | End: 2024-09-12

## 2024-09-12 RX ORDER — DEXAMETHASONE SODIUM PHOSPHATE 10 MG/ML
INJECTION INTRAMUSCULAR; INTRAVENOUS AS NEEDED
Status: DISCONTINUED | OUTPATIENT
Start: 2024-09-12 | End: 2024-09-12

## 2024-09-12 RX ORDER — SODIUM CHLORIDE, SODIUM LACTATE, POTASSIUM CHLORIDE, CALCIUM CHLORIDE 600; 310; 30; 20 MG/100ML; MG/100ML; MG/100ML; MG/100ML
20 INJECTION, SOLUTION INTRAVENOUS CONTINUOUS
Status: DISCONTINUED | OUTPATIENT
Start: 2024-09-12 | End: 2024-09-12

## 2024-09-12 RX ORDER — HYDROMORPHONE HYDROCHLORIDE 2 MG/ML
0.5 INJECTION, SOLUTION INTRAMUSCULAR; INTRAVENOUS; SUBCUTANEOUS EVERY 5 MIN PRN
Status: CANCELLED | OUTPATIENT
Start: 2024-09-12

## 2024-09-12 RX ORDER — LIDOCAINE HYDROCHLORIDE 10 MG/ML
INJECTION, SOLUTION EPIDURAL; INFILTRATION; INTRACAUDAL; PERINEURAL AS NEEDED
Status: DISCONTINUED | OUTPATIENT
Start: 2024-09-12 | End: 2024-09-12

## 2024-09-12 RX ORDER — SODIUM CHLORIDE, SODIUM LACTATE, POTASSIUM CHLORIDE, CALCIUM CHLORIDE 600; 310; 30; 20 MG/100ML; MG/100ML; MG/100ML; MG/100ML
100 INJECTION, SOLUTION INTRAVENOUS CONTINUOUS
Status: CANCELLED | OUTPATIENT
Start: 2024-09-12

## 2024-09-12 RX ORDER — FENTANYL CITRATE 50 UG/ML
INJECTION, SOLUTION INTRAMUSCULAR; INTRAVENOUS AS NEEDED
Status: DISCONTINUED | OUTPATIENT
Start: 2024-09-12 | End: 2024-09-12

## 2024-09-12 SDOH — HEALTH STABILITY: MENTAL HEALTH: CURRENT SMOKER: 0

## 2024-09-12 ASSESSMENT — PAIN SCALES - GENERAL
PAINLEVEL_OUTOF10: 0 - NO PAIN
PAINLEVEL_OUTOF10: 0 - NO PAIN
PAINLEVEL_OUTOF10: 4
PAINLEVEL_OUTOF10: 0 - NO PAIN
PAINLEVEL_OUTOF10: 3
PAINLEVEL_OUTOF10: 0 - NO PAIN

## 2024-09-12 ASSESSMENT — COGNITIVE AND FUNCTIONAL STATUS - GENERAL
DAILY ACTIVITIY SCORE: 24
MOBILITY SCORE: 24

## 2024-09-12 ASSESSMENT — PAIN - FUNCTIONAL ASSESSMENT
PAIN_FUNCTIONAL_ASSESSMENT: 0-10

## 2024-09-12 ASSESSMENT — ENCOUNTER SYMPTOMS
RESPIRATORY NEGATIVE: 1
PSYCHIATRIC NEGATIVE: 1
NEUROLOGICAL NEGATIVE: 1
CARDIOVASCULAR NEGATIVE: 1
HEMATOLOGIC/LYMPHATIC NEGATIVE: 1
ARTHRALGIAS: 1
ENDOCRINE NEGATIVE: 1
CONSTITUTIONAL NEGATIVE: 1

## 2024-09-12 ASSESSMENT — PAIN DESCRIPTION - LOCATION: LOCATION: LEG

## 2024-09-12 ASSESSMENT — PAIN DESCRIPTION - ORIENTATION: ORIENTATION: LEFT;LOWER

## 2024-09-12 ASSESSMENT — ACTIVITIES OF DAILY LIVING (ADL): LACK_OF_TRANSPORTATION: NO

## 2024-09-12 NOTE — PROGRESS NOTES
"Belgica Pinto is a 64 y.o. female on day 1 of admission presenting with Cellulitis and abscess of left lower extremity.    Subjective   Pain little better  No fever chills nausea vomiting         Objective     Physical Exam  General Appearance: AAO x 3,   Skin: skin color pink, warm, and dry; no suspicious rashes or lesions  Eyes : PERRL, EOM's intact  ENT: mucous membranes pink and moist  Neck: normocephalic  Respiratory: lungs clear to auscultation anteriorly; no wheezing, rhonchi, or crackles.   Heart: regular rate and rhythm.   Abdomen: Nondistended, positive bowel sounds x4, soft,  nontender  Extremities: Left leg with erythema tenderness, dressing  Peripheral pulses: normal x4 extremities  Neuro: alert, coherent and conversant, no focal motor deficits  Last Recorded Vitals  Blood pressure 151/76, pulse 62, temperature 36.7 °C (98.1 °F), temperature source Temporal, resp. rate 16, height 1.549 m (5' 1\"), weight 74.9 kg (165 lb 2 oz), SpO2 92%.  Intake/Output last 3 Shifts:  I/O last 3 completed shifts:  In: 3484.5 (46.5 mL/kg) [P.O.:480; I.V.:2404.5 (32.1 mL/kg); IV Piggyback:600]  Out: - (0 mL/kg)   Weight: 74.9 kg     Relevant Results   Scheduled medications  bacitracin, , Topical, TID  piperacillin-tazobactam, 3.375 g, intravenous, q6h  polyethylene glycol, 17 g, oral, Daily  vancomycin, 1,000 mg, intravenous, q12h      Continuous medications  lactated Ringer's, 100 mL/hr      PRN medications  PRN medications: acetaminophen **OR** acetaminophen **OR** acetaminophen, acetaminophen **OR** acetaminophen **OR** acetaminophen, morphine, morphine, ondansetron ODT **OR** ondansetron, vancomycin    Results for orders placed or performed during the hospital encounter of 09/11/24 (from the past 24 hour(s))   CBC   Result Value Ref Range    WBC 9.0 4.4 - 11.3 x10*3/uL    nRBC 0.0 0.0 - 0.0 /100 WBCs    RBC 3.57 (L) 4.00 - 5.20 x10*6/uL    Hemoglobin 10.5 (L) 12.0 - 16.0 g/dL    Hematocrit 32.5 (L) 36.0 - 46.0 %    " MCV 91 80 - 100 fL    MCH 29.4 26.0 - 34.0 pg    MCHC 32.3 32.0 - 36.0 g/dL    RDW 12.9 11.5 - 14.5 %    Platelets 277 150 - 450 x10*3/uL   Comprehensive metabolic panel   Result Value Ref Range    Glucose 102 (H) 74 - 99 mg/dL    Sodium 139 136 - 145 mmol/L    Potassium 4.0 3.5 - 5.3 mmol/L    Chloride 106 98 - 107 mmol/L    Bicarbonate 27 21 - 32 mmol/L    Anion Gap 10 10 - 20 mmol/L    Urea Nitrogen 10 6 - 23 mg/dL    Creatinine 0.70 0.50 - 1.05 mg/dL    eGFR >90 >60 mL/min/1.73m*2    Calcium 8.3 (L) 8.6 - 10.3 mg/dL    Albumin 3.1 (L) 3.4 - 5.0 g/dL    Alkaline Phosphatase 66 33 - 136 U/L    Total Protein 6.1 (L) 6.4 - 8.2 g/dL    AST 10 9 - 39 U/L    Bilirubin, Total 0.4 0.0 - 1.2 mg/dL    ALT 13 7 - 45 U/L   Vancomycin, Trough   Result Value Ref Range    Vancomycin, Trough 21.7 (HH) 5.0 - 20.0 ug/mL       Lower extremity venous duplex left    Result Date: 9/11/2024             [place] [address] Phone [phone], Fax [phone]  Vascular Lab Report  [facility] US LOWER EXTREMITY VENOUS DUPLEX LEFT Patient Name:      CECILIA Zavala Physician:  [id number] Mejia Gutierrez MD Study Date:        9/11/2024           Ordering Provider:  [id number] MARAH ENGLISH MRN/PID:           [id number]            Fellow: Accession#:        [id number]        Technologist:       Antoine Huff RVT Date of Birth/Age: 1960 / 64 years Technologist 2: Gender:            F                   Encounter#:         [id number] Admission Status:  Emergency           Location Performed: [facility]  Diagnosis/ICD: Pain in left lower leg-M79.662 CPT Codes:     23524 Peripheral venous duplex scan for DVT Limited  Pertinent History: Leg pain.  CONCLUSIONS: Right Lower Venous: The right common femoral vein demonstrates normal spontaneous and  respirophasic flow. Left Lower Venous: No evidence of acute deep vein thrombus visualized in the left lower extremity.  Imaging & Doppler Findings:  Right        Flow CFV   Spontaneous/Phasic  Left                  Compress Thrombus        Flow Distal External Iliac                   Spontaneous/Phasic CFV                     Yes      None   Spontaneous/Phasic PFV                     Yes      None FV Proximal             Yes      None   Spontaneous/Phasic FV Mid                  Yes      None FV Distal               Yes      None Popliteal               Yes      None   Spontaneous/Phasic Peroneal                Yes      None PTV                     Yes      None  [id number] Mejia Gutierrez MD Electronically signed by [id number] Mejia Gutierrez MD on 9/11/2024 at 5:14:59 PM  ** Final **     CT lower extremity left w IV contrast    Result Date: 9/11/2024  Interpreted By:  Yenni Zhang, STUDY: CT LOWER EXTREMITY LEFT W IV CONTRAST; ;  9/11/2024 12:10 pm   INDICATION: Signs/Symptoms:Infection of left lower leg, from the knee down.     COMPARISON: None.   ACCESSION NUMBER(S): [id number]   ORDERING CLINICIAN: MARAH ENGLISH   TECHNIQUE: Serial axial CT images obtained of the left leg following intravenous administration of 73 mL of Omnipaque 350. Images reformatted in the coronal and sagittal projection   FINDINGS: Subcutaneous tissues of the left calf demonstrate curvilinear area of fluid density within the pretibial location of the proximal left calf with subtle peripheral rim enhancement without foci of gas demonstrated. This area of fluid signal extends to the anteromedial margin of the proximal calf with collection measuring 1.0 x 5.2 x 6.9 cm in the AP, transverse and craniocaudal dimension respectively. This extends from the level of the tibial tuberosity distally along the pretibial location. Findings are in keeping with abscess formation in the appropriate clinical situation. Surrounding  subcutaneous edema demonstrated. Subtle curvilinear fluid and edema about the medial calf proximally.   Generalized subcutaneous edema within the proximal calf. There is component of skin thickening and subcutaneous edema with cellulitis in the appropriate clinical situation. The edema tracks along superficial fascial plane of the proximal to mid calf. There is no edema in the inter fascial planes within the muscle compartments of the left calf. No focal fluid density or evidence for infectious myositis.   Tibia and fibula demonstrate no cortical or trabecular abnormality. Extensor mechanism is intact. Achilles tendon is unremarkable. No ankle joint effusion. Visualized popliteal vasculature as well as extending into the calf distally unremarkable.               1. Pretibial subcutaneous fluid collection from the level of the tibial tuberosity extending distally measuring 6.9 cm in the craniocaudal dimension with abscess formation of concern. No foci of gas within the collection of the surrounding subcutaneous edema demonstrated. This suggests underlying cellulitis. Correlate with focal ulceration and fluid leaking from the collection.   2. Cellulitis within the calf with edema tracking along superficial fascial plane of the proximal to mid calf.   3. No infectious myositis.     MACRO: None   Signed by: Yenni Zhang 9/11/2024 1:55 PM Dictation workstation:   [id number]                       Assessment/Plan   Assessment & Plan  Cellulitis and abscess of left lower extremity    Abscess of left leg      64-year-old female with left leg cellulitis and abscess  Plan  I&D per Ortho today  Daily CBC BMP  Follow cultures, follow signs of clinical improvement  Local wound care  On vancomycin and Zosyn IV  Supportive care symptomatic management  Education counseling  Bacitracin ointment  Hydrate well with Ringer lactate at 100 cc/h  Tylenol for fever  Antiemetics if required  Morphine IV 0.5 to 1 mg every 4-6 hours as  needed for moderate to severe pain  Cardiac diet  DVT prophylaxis per policy  Monitor vitals                            Parish Rinaldi MD

## 2024-09-12 NOTE — OP NOTE
Lower Extremity I  and  D (L) Operative Note     Date: 2024  OR Location: [facility] OR    Name: Belgica Pinto, : 1960, Age: 64 y.o., MRN: [id number], Sex: female    Diagnosis  Pre-op Diagnosis      * Abscess of left leg [L02.416] Post-op Diagnosis     * Abscess of left leg [L02.416]     Procedures  Lower Extremity I  and  D  22260 - SC INCISION & DRAINAGE LEG/ANKLE INFECTED BURSA      Surgeons      * Ramone Cedeño - Primary    Resident/Fellow/Other Assistant:  Surgeons and Role:  * No surgeons found with a matching role *    Procedure Summary  Anesthesia: General  ASA: II  Anesthesia Staff: Anesthesiologist: Dion Velazquez MD PhD  Estimated Blood Loss: 50 mL  Intra-op Medications:   Administrations occurring from 1235 to 1335 on 24:   Medication Name Total Dose   bacitracin ointment Cannot be calculated   lactated Ringer's infusion 2.33 mL   morphine injection 0.5 mg Cannot be calculated   morphine injection 1 mg Cannot be calculated   piperacillin-tazobactam (Zosyn) 3.375 g in dextrose (iso) IV 50 mL 3.375 g   polyethylene glycol (Glycolax, Miralax) packet 17 g Cannot be calculated   vancomycin (Vancocin) 1,000 mg in sodium chloride 0.9%  mL Cannot be calculated   vancomycin (Vancocin) pharmacy to dose - pharmacy monitoring Cannot be calculated              Anesthesia Record               Intraprocedure I/O Totals          Intake    lactated Ringer's infusion 800.00 mL    Total Intake 800 mL          Specimen:   ID Type Source Tests Collected by Time   A : Left Leg Abscess Culture #1 Swab ABSCESS FUNGAL CULTURE/SMEAR, TISSUE/WOUND CULTURE/SMEAR Yfn Murphy RN 2024 1505   B : Left Leg Abscess Culture #2 Swab ABSCESS FUNGAL CULTURE/SMEAR, TISSUE/WOUND CULTURE/SMEAR Ramone Cedeño MD 2024 1508        Staff:   Circulator: Yfn Henson Person: Scarlett         Drains and/or Catheters: * None in log *    Tourniquet Times:     Total Tourniquet Time Documented:  Leg (Left) - 22  minutes  Total: Leg (Left) - 22 minutes      Implants:     Findings: Abscess with purulence    Indications: Belgica Pinto is an 64 y.o. female who is having surgery for Abscess of left leg [L02.416].     The patient was seen in the preoperative area. The risks, benefits, complications, treatment options, non-operative alternatives, expected recovery and outcomes were discussed with the patient. The possibilities of reaction to medication, pulmonary aspiration, injury to surrounding structures, bleeding, recurrent infection, the need for additional procedures, failure to diagnose a condition, and creating a complication requiring transfusion or operation were discussed with the patient. The patient concurred with the proposed plan, giving informed consent.  The site of surgery was properly noted/marked if necessary per policy. The patient has been actively warmed in preoperative area. Preoperative antibiotics have been ordered and given within 1 hours of incision. Venous thrombosis prophylaxis have been ordered including unilateral sequential compression device    Procedure Details: Prior to the procedure, the patient's allergies were reviewed. The procedure site was marked. The procedure team checked for proper functioning of devices and supplies to be used for the procedure. The procedure team reviewed the relevant diagnostic tests pertinent to the procedure. The risks, benefits and anticipated outcomes of the procedure, the risks and benefits of the alternatives to the procedure, and the roles and tasks of the personnel to be involved were discussed with the patient, who wished to proceed.    The patient was sterilely prepped and draped in the usual fashion.  A tourniquet was applied to the left lower extremity.  The patient was then prepped and draped and during the elevation of this after she had been elevated for more than 5 minutes the tourniquet was elevated to 350 mmHg pressure.  No Esmarch was  utilized.  The area of fluctuance was then palpated and a 4 cm incision was made in a linear fashion over this area.  As soon as the skin and subcutaneous tissue was opened the abscess cavity was entered and a brown purulence with phlegmon was aspirated.  Cultures were obtained and they will be sent for Gram stain, aerobic and anaerobic cultures.  And fungal cultures.  3 L of lactated ringer was irrigated through the wound cleaning out the purulence and phlegmon.  A curette was used to remove any devitalized tissues.  I palpated around the margins of the abscess cavity.  It went down towards the posterior compartment medially.  There was some aspects of the cavity that crossed anteriorly in front of the tibia in the subcu area.  There was no indication that the cavity entered the underlying fascia.  This infection appeared to be above the fascia and below the subcutaneous fat of the skin.  Once the entire cavity had been discerned and opened with blunt dissection mainly just palpating the edges until we came to a distinct border the cavity was further irrigated with 3 L of gentamicin antibiotic irrigation.  At this point a cleaning soaked with normal saline with a splash of hydrogen peroxide and Betadine was packed into the wound and brought through the inferior aspect of the incision.  A single #1 Prolene stitch was then used to reapproximate the skin edges.  Dry sterile, dressings were then applied.  Patient tolerated this well and returned to the recovery room in stable condition.  Complications:  None; patient tolerated the procedure well.    Disposition: PACU - hemodynamically stable.  Condition: stable         Additional Details:     Attending Attestation:     Ramone Cedeño  Phone Number: [phone]

## 2024-09-12 NOTE — PROGRESS NOTES
"Belgica Pinto is a 64 y.o. female on day 1 of admission presenting with Cellulitis and abscess of left lower extremity.    Subjective   Belgica is a pleasant 64-year-old female admitted for cellulitis and abscess of the left lower extremity yesterday.  Patient states symptoms are controlled with pain and feels symptoms are slightly improving since hospital admission.  Patient wishes to pursue with planned surgical I&D this afternoon.  Patient has been n.p.o. since midnight.    Review of Systems   Constitutional: Negative.    HENT: Negative.     Respiratory: Negative.     Cardiovascular: Negative.    Endocrine: Negative.    Musculoskeletal:  Positive for arthralgias.   Skin: Negative.    Neurological: Negative.    Hematological: Negative.    Psychiatric/Behavioral: Negative.               Objective     Physical Exam  Left lower leg    Anterior medial just distal to tibial tubercle area of redness and calor.   Fluctuance in this area.  This is mildly tender to palpation   Full ROM of distal joints with mild sx aggravation  Distal motor and sensory intact, cap refill at 2 seconds.  CT scan shows this superficial to the fascia.  No ascending lymphangitis  Calf is supple and non tender.    Last Recorded Vitals  Blood pressure 124/70, pulse 60, temperature 36.6 °C (97.9 °F), temperature source Temporal, resp. rate 16, height 1.549 m (5' 0.98\"), weight 74.9 kg (165 lb 2 oz), SpO2 94%.  Intake/Output last 3 Shifts:  I/O last 3 completed shifts:  In: 3484.5 (46.5 mL/kg) [P.O.:480; I.V.:2404.5 (32.1 mL/kg); IV Piggyback:600]  Out: - (0 mL/kg)   Weight: 74.9 kg     Relevant Results    STUDY:  CT LOWER EXTREMITY LEFT W IV CONTRAST; ;  9/11/2024 12:10 pm      INDICATION:  Signs/Symptoms:Infection of left lower leg, from the knee down.          COMPARISON:  None.      ACCESSION NUMBER(S):  [id number]      ORDERING CLINICIAN:  MARAH ENGLISH      TECHNIQUE:  Serial axial CT images obtained of the left leg following " intravenous  administration of 73 mL of Omnipaque 350. Images reformatted in the  coronal and sagittal projection      FINDINGS:  Subcutaneous tissues of the left calf demonstrate curvilinear area of  fluid density within the pretibial location of the proximal left calf  with subtle peripheral rim enhancement without foci of gas  demonstrated. This area of fluid signal extends to the anteromedial  margin of the proximal calf with collection measuring 1.0 x 5.2 x 6.9  cm in the AP, transverse and craniocaudal dimension respectively.  This extends from the level of the tibial tuberosity distally along  the pretibial location. Findings are in keeping with abscess  formation in the appropriate clinical situation. Surrounding  subcutaneous edema demonstrated. Subtle curvilinear fluid and edema  about the medial calf proximally.      Generalized subcutaneous edema within the proximal calf. There is  component of skin thickening and subcutaneous edema with cellulitis  in the appropriate clinical situation. The edema tracks along  superficial fascial plane of the proximal to mid calf. There is no  edema in the inter fascial planes within the muscle compartments of  the left calf. No focal fluid density or evidence for infectious  myositis.      Tibia and fibula demonstrate no cortical or trabecular abnormality.  Extensor mechanism is intact. Achilles tendon is unremarkable. No  ankle joint effusion. Visualized popliteal vasculature as well as  extending into the calf distally unremarkable.                      IMPRESSION:  1. Pretibial subcutaneous fluid collection from the level of the  tibial tuberosity extending distally measuring 6.9 cm in the  craniocaudal dimension with abscess formation of concern. No foci of  gas within the collection of the surrounding subcutaneous edema  demonstrated. This suggests underlying cellulitis. Correlate with  focal ulceration and fluid leaking from the collection.      2. Cellulitis  within the calf with edema tracking along superficial  fascial plane of the proximal to mid calf.      3. No infectious myositis.          MACRO:  None      Signed by: Yenni Zhang 9/11/2024 1:55 PM  Dictation workstation:   [id number]  Scheduled medications  bacitracin, , Topical, TID  piperacillin-tazobactam, 3.375 g, intravenous, q6h  polyethylene glycol, 17 g, oral, Daily  vancomycin, 1,000 mg, intravenous, q12h      Continuous medications  sodium chloride 0.9%, 150 mL/hr, Last Rate: 150 mL/hr (09/11/24 2307)      PRN medications  PRN medications: acetaminophen **OR** acetaminophen **OR** acetaminophen, acetaminophen **OR** acetaminophen **OR** acetaminophen, morphine, morphine, ondansetron ODT **OR** ondansetron, vancomycin  Results for orders placed or performed during the hospital encounter of 09/11/24 (from the past 24 hour(s))   CBC   Result Value Ref Range    WBC 9.0 4.4 - 11.3 x10*3/uL    nRBC 0.0 0.0 - 0.0 /100 WBCs    RBC 3.57 (L) 4.00 - 5.20 x10*6/uL    Hemoglobin 10.5 (L) 12.0 - 16.0 g/dL    Hematocrit 32.5 (L) 36.0 - 46.0 %    MCV 91 80 - 100 fL    MCH 29.4 26.0 - 34.0 pg    MCHC 32.3 32.0 - 36.0 g/dL    RDW 12.9 11.5 - 14.5 %    Platelets 277 150 - 450 x10*3/uL   Comprehensive metabolic panel   Result Value Ref Range    Glucose 102 (H) 74 - 99 mg/dL    Sodium 139 136 - 145 mmol/L    Potassium 4.0 3.5 - 5.3 mmol/L    Chloride 106 98 - 107 mmol/L    Bicarbonate 27 21 - 32 mmol/L    Anion Gap 10 10 - 20 mmol/L    Urea Nitrogen 10 6 - 23 mg/dL    Creatinine 0.70 0.50 - 1.05 mg/dL    eGFR >90 >60 mL/min/1.73m*2    Calcium 8.3 (L) 8.6 - 10.3 mg/dL    Albumin 3.1 (L) 3.4 - 5.0 g/dL    Alkaline Phosphatase 66 33 - 136 U/L    Total Protein 6.1 (L) 6.4 - 8.2 g/dL    AST 10 9 - 39 U/L    Bilirubin, Total 0.4 0.0 - 1.2 mg/dL    ALT 13 7 - 45 U/L   Vancomycin, Trough   Result Value Ref Range    Vancomycin, Trough 21.7 (HH) 5.0 - 20.0 ug/mL        WBC improving                    Assessment/Plan   Assessment  & Plan  Cellulitis and abscess of left lower extremity    Abscess of left leg    Anticipated surgical I&D this afternoon with Dr. Cedeño.  Ortho will continue to follow while inpatient.       I spent 25 minutes in the professional and overall care of this patient.      Karol Sandoval, APRN-CNP

## 2024-09-12 NOTE — CARE PLAN
Problem: Skin  Goal: Decreased wound size/increased tissue granulation at next dressing change  9/11/2024 2106 by Renata Sanders RN  Outcome: Progressing  Flowsheets (Taken 9/11/2024 2106)  Decreased wound size/increased tissue granulation at next dressing change: Promote sleep for wound healing  9/11/2024 1726 by Renata Sanders RN  Outcome: Progressing  Flowsheets (Taken 9/11/2024 1726)  Decreased wound size/increased tissue granulation at next dressing change: Promote sleep for wound healing  Goal: Participates in plan/prevention/treatment measures  9/11/2024 2106 by Renata Sanders RN  Outcome: Progressing  Flowsheets (Taken 9/11/2024 2106)  Participates in plan/prevention/treatment measures: Increase activity/out of bed for meals  9/11/2024 1726 by Renata Sanders RN  Outcome: Progressing  Flowsheets (Taken 9/11/2024 1726)  Participates in plan/prevention/treatment measures: Increase activity/out of bed for meals  Goal: Promote/optimize nutrition  9/11/2024 2106 by Renata Sanders RN  Outcome: Progressing  Flowsheets (Taken 9/11/2024 2106)  Promote/optimize nutrition: Consume > 50% meals/supplements  9/11/2024 1726 by Renata Sanders RN  Outcome: Progressing  Flowsheets (Taken 9/11/2024 1726)  Promote/optimize nutrition: Consume > 50% meals/supplements  Goal: Promote skin healing  9/11/2024 2106 by Renata Sanders RN  Outcome: Progressing  Flowsheets (Taken 9/11/2024 2106)  Promote skin healing: Turn/reposition every 2 hours/use positioning/transfer devices  9/11/2024 1726 by Renata Sanders RN  Outcome: Progressing  Flowsheets (Taken 9/11/2024 1726)  Promote skin healing: Turn/reposition every 2 hours/use positioning/transfer devices     Problem: Pain - Adult  Goal: Verbalizes/displays adequate comfort level or baseline comfort level  9/11/2024 2106 by Renata Sanders RN  Outcome: Progressing  9/11/2024 1726 by Renata Sanders RN  Outcome: Progressing     Problem: Safety - Adult  Goal: Free from  fall injury  9/11/2024 2106 by Renata Sanders RN  Outcome: Progressing  9/11/2024 1726 by Renata Sanders RN  Outcome: Progressing     Problem: Discharge Planning  Goal: Discharge to home or other facility with appropriate resources  9/11/2024 2106 by Renata Sanders RN  Outcome: Progressing  9/11/2024 1726 by Renata Sanders RN  Outcome: Progressing     Problem: Chronic Conditions and Co-morbidities  Goal: Patient's chronic conditions and co-morbidity symptoms are monitored and maintained or improved  9/11/2024 2106 by Renata Sanders RN  Outcome: Progressing  9/11/2024 1726 by Renata Sanders RN  Outcome: Progressing     Problem: Pain  Goal: Takes deep breaths with improved pain control throughout the shift  9/11/2024 2106 by Renata Sanders RN  Outcome: Progressing  9/11/2024 1726 by Renata Sanders RN  Outcome: Progressing  Goal: Turns in bed with improved pain control throughout the shift  9/11/2024 2106 by Renata Sanders RN  Outcome: Progressing  9/11/2024 1726 by Renata Sanders RN  Outcome: Progressing  Goal: Walks with improved pain control throughout the shift  9/11/2024 2106 by Renata Sanders RN  Outcome: Progressing  9/11/2024 1726 by Renata Sanders RN  Outcome: Progressing  Goal: Performs ADL's with improved pain control throughout shift  9/11/2024 2106 by Renata Sanders RN  Outcome: Progressing  9/11/2024 1726 by Renata Sanders RN  Outcome: Progressing  Goal: Participates in PT with improved pain control throughout the shift  9/11/2024 2106 by Renata Sanders RN  Outcome: Progressing  9/11/2024 1726 by Renata Sanders RN  Outcome: Progressing  Goal: Free from opioid side effects throughout the shift  9/11/2024 2106 by Renata Sanders RN  Outcome: Progressing  9/11/2024 1726 by Renata Sanders RN  Outcome: Progressing  Goal: Free from acute confusion related to pain meds throughout the shift  9/11/2024 2106 by Renata Sanders RN  Outcome: Progressing  9/11/2024 1726 by Renata  MARIAH Sanders RN  Outcome: Progressing   The patient's goals for the shift include no falls    The clinical goals for the shift include less swelling    Over the shift, the patient did not make progress toward the following goals. Barriers to progression include . Recommendations to address these barriers include .

## 2024-09-12 NOTE — PROGRESS NOTES
09/12/24 1449   Discharge Planning   Living Arrangements Children   Support Systems Children   Assistance Needed None   Type of Residence Private residence   Number of Stairs to Enter Residence 3   Do you have animals or pets at home? Yes   Type of Animals or Pets 2 turtles, goats, cattle   Who is requesting discharge planning? Provider   Home or Post Acute Services None   Expected Discharge Disposition Home   Does the patient need discharge transport arranged? No   Financial Resource Strain   How hard is it for you to pay for the very basics like food, housing, medical care, and heating? Not hard   Housing Stability   In the last 12 months, was there a time when you were not able to pay the mortgage or rent on time? N   In the past 12 months, how many times have you moved where you were living? 0   At any time in the past 12 months, were you homeless or living in a shelter (including now)? N   Transportation Needs   In the past 12 months, has lack of transportation kept you from medical appointments or from getting medications? no   In the past 12 months, has lack of transportation kept you from meetings, work, or from getting things needed for daily living? No     Care Transitions: Patient reviewed in care round meeting this AM, ADOD 48 hours. Met with patient at bedside. Role of TCC explained. Demographics and contacts verified. She lives in a 2 story farm house with 2 adult sons. She does not have a PCP, states she never has to go to a Dr. PCP list provided os available providers in her area. Preferred pharmacy [facility] in [place]. She is independent at home with all ADL's and drives self. Denies the need for any DME equipment and does not use any. AMPAC score 24/24. Discharge plan is home, denies any needs or in home services at this time. Family to transport home when medically ready. Care team to follow up for any needs. Julia Keller RN/TCC

## 2024-09-12 NOTE — ANESTHESIA PREPROCEDURE EVALUATION
Patient: Belgica Pinto    Procedure Information       Date/Time: 09/12/24 1235    Procedure: Lower Extremity I  and  D (Left: Leg Lower)    Location: [facility] OR 01 / [facility]    Surgeons: Ramone Cedeño MD            Relevant Problems   Anesthesia (within normal limits)   (-) Difficult intubation   (-) Malignant hyperthermia   (-) PONV (postoperative nausea and vomiting)      Cardiac (within normal limits)      Pulmonary (within normal limits)      Neuro (within normal limits)      GI (within normal limits)      /Renal (within normal limits)      Liver (within normal limits)      Endocrine (within normal limits)      Hematology (within normal limits)      Musculoskeletal (within normal limits)   (+) Cellulitis and abscess of left lower extremity      HEENT (within normal limits)      ID (within normal limits)      Skin (within normal limits)      GYN (within normal limits)       Clinical information reviewed:   Tobacco  Allergies  Meds   Med Hx  Surg Hx  OB Status  Fam Hx  Soc   Hx        NPO Detail:  NPO/Void Status  Carbohydrate Drink Given Prior to Surgery? : N  Date of Last Liquid: 09/11/24  Time of Last Liquid: 2345  Date of Last Solid: 09/11/24  Time of Last Solid: 2100  Last Intake Type: Clear fluids  Time of Last Void: 1300         Physical Exam    Airway  Mallampati: II  TM distance: >3 FB  Neck ROM: full     Cardiovascular   Rhythm: regular  Rate: normal     Dental - normal exam     Pulmonary   Breath sounds clear to auscultation     Abdominal            Anesthesia Plan    History of general anesthesia?: yes  History of complications of general anesthesia?: no    ASA 2     general     The patient is not a current smoker.    intravenous induction   Postoperative administration of opioids is intended.  Anesthetic plan and risks discussed with patient.

## 2024-09-12 NOTE — NURSING NOTE
Pt returned from PACU to room 314. Pt ambulated to restroom with walker. Tolerated well. C/o 5/10 pain.

## 2024-09-12 NOTE — CONSULTS
Vancomycin Dosing by Pharmacy- FOLLOW UP    Belgica Pinto is a 64 y.o. year old female who Pharmacy has been consulted for vancomycin dosing for cellulitis, skin and soft tissue. Based on the patient's indication and renal status this patient is being dosed based on a goal AUC of 400-600.     Renal function is currently stable.    Current vancomycin dose: 1000 mg given every 12 hours    Most recent random level: 21.7 mcg/mL    Visit Vitals  /57 (Patient Position: Lying)   Pulse 65   Temp 36 °C (96.8 °F) (Temporal)   Resp 16        Lab Results   Component Value Date    CREATININE 0.70 2024    CREATININE 0.63 2024        Patient weight is as follows:   Vitals:    24 1709   Weight: 74.9 kg (165 lb 2 oz)       Cultures:  No results found for the encounter in last 14 days.       I/O last 3 completed shifts:  In: 2157.5 (28.8 mL/kg) [P.O.:480; I.V.:1077.5 (14.4 mL/kg); IV Piggyback:600]  Out: - (0 mL/kg)   Weight: 74.9 kg   I/O during current shift:  I/O this shift:  In: 1327 [I.V.:1327]  Out: -     Temp (24hrs), Av.3 °C (97.4 °F), Min:36 °C (96.8 °F), Max:36.6 °C (97.8 °F)      Assessment/Plan    Trending to AUC near 600.  Will lower dose slightly.    This dosing regimen is predicted by [employer] to result in the following pharmacokinetic parameters:  Regimen: 1000 mg IV every 12 hours.  Start time: 12:23 on 2024  Exposure target: AUC24 (range)400-600 mg/L.hr   QCA93-82: 448 mg/L.hr  AUC24,ss: 474 mg/L.hr  Probability of AUC24 > 400: 85 %  Ctrough,ss: 12.6 mg/L  Probability of Ctrough,ss > 20: 3 %      The next level will be obtained on 2024 at 0500. May be obtained sooner if clinically indicated.   Will continue to monitor renal function daily while on vancomycin and order serum creatinine at least every 48 hours if not already ordered.  Follow for continued vancomycin needs, clinical response, and signs/symptoms of toxicity.       Robert Sheets RPh

## 2024-09-12 NOTE — ANESTHESIA PROCEDURE NOTES
Airway  Date/Time: 9/12/2024 2:47 PM  Urgency: elective    Airway not difficult    Staffing  Performed: attending   Authorized by: Dion Velazquez MD PhD    Performed by: Dion Velazquez MD PhD  Patient location during procedure: OR    Indications and Patient Condition  Indications for airway management: anesthesia  Spontaneous ventilation: present  Sedation level: deep  Preoxygenated: yes  MILS maintained throughout  Mask difficulty assessment: 1 - vent by mask    Final Airway Details  Final airway type: supraglottic airway      Successful airway: unique  Size 3     Number of attempts at approach: 1

## 2024-09-12 NOTE — H&P
History Of Present Illness  Belgica Pinto is a 64 y.o. female presenting with pain and swelling left leg with leukocytosis failed outpatient antibiotic therapy and admitted with worsening symptoms.  She took 6 days of antibiotics to complete 7-day course but felt no significant improvement and hence came to hospital for further management.  Denies any active bleeding or oozing pus drainage.  No fever or chills.  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  No nausea vomiting diarrhea or bleeding as such.  She has left leg cellulitis and subcutaneous abscess.  Trauma from tire iron.  There is warmth redness pain tenderness erythema fluctuance.  No malaise.  No sweating nervousness or tremors or palpitations or lightheadedness.  No syncope no chest pain or shortness of breath.  No pain in right leg.  No recent travel or weight loss.  No other joint pains or skin rash.  Fairly active and healthy denies prior history of heart attack or stroke or diabetes.  No circulation problems per the patient.  Increasing pain and swelling.  Denies any active bleeding.  Started on IV antibiotics and orthopedic surgery consulted.  CT imaging showing pretibial subcutaneous fluid collection from the level of the tibial tuberosity extending distally meaning 6.9 cm in craniocaudal dimension with abscess formation.  No foci of gas within collection of surrounding subcutaneous edema.  Underlying cellulitis.  Correlate with focal ulceration and fluid leaking from collection.  No infectious myositis.  Inflammatory markers elevated with ESR and CRP.  No URI  No headache or focal weakness  No back pain flank pain hematuria dysuria  Lactate level fine  Past Medical History  None per the patient    Surgical History  History reviewed. No pertinent surgical history.     Social History  She reports that she has never smoked. She has never used smokeless tobacco. She  "reports that she does not drink alcohol and does not use drugs.    Family History  Not pertinent to HPI per the patient     Allergies  Patient has no known allergies.    Review of Systems  All other 12 point review of systems negative except HPI  Physical Exam   General Appearance: AAO x 3,   Skin: skin color pink, warm, and dry; no suspicious rashes or lesions  Eyes : PERRL, EOM's intact  ENT: mucous membranes pink and moist  Neck: normocephalic  Respiratory: lungs clear to auscultation anteriorly; no wheezing, rhonchi, or crackles.   Heart: regular rate and rhythm.   Abdomen: Nondistended, positive bowel sounds x4, soft,  nontender  Extremities: Left leg with signs of cellulitis erythema redness tenderness calor fluctuance warmth.  Peripheral pulses: normal x4 extremities  Neuro: alert, coherent and conversant, no focal motor deficits  Last Recorded Vitals  Blood pressure 123/65, pulse 73, temperature 36.4 °C (97.5 °F), temperature source Temporal, resp. rate 16, height 1.549 m (5' 0.98\"), weight 74.9 kg (165 lb 2 oz), SpO2 94%.    Relevant Results  Scheduled medications  bacitracin, , Topical, TID  piperacillin-tazobactam, 3.375 g, intravenous, q6h  polyethylene glycol, 17 g, oral, Daily  [START ON 9/12/2024] vancomycin, 1,250 mg, intravenous, q12h      Continuous medications  sodium chloride 0.9%, 150 mL/hr, Last Rate: 150 mL/hr (09/11/24 2107)      PRN medications  PRN medications: acetaminophen **OR** acetaminophen **OR** acetaminophen, acetaminophen **OR** acetaminophen **OR** acetaminophen, morphine, morphine, ondansetron ODT **OR** ondansetron, vancomycin    Results for orders placed or performed during the hospital encounter of 09/11/24 (from the past 24 hour(s))   Comprehensive Metabolic Panel   Result Value Ref Range    Glucose 116 (H) 74 - 99 mg/dL    Sodium 136 136 - 145 mmol/L    Potassium 4.0 3.5 - 5.3 mmol/L    Chloride 100 98 - 107 mmol/L    Bicarbonate 28 21 - 32 mmol/L    Anion Gap 12 10 - 20 " mmol/L    Urea Nitrogen 11 6 - 23 mg/dL    Creatinine 0.63 0.50 - 1.05 mg/dL    eGFR >90 >60 mL/min/1.73m*2    Calcium 9.2 8.6 - 10.3 mg/dL    Albumin 3.9 3.4 - 5.0 g/dL    Alkaline Phosphatase 85 33 - 136 U/L    Total Protein 7.5 6.4 - 8.2 g/dL    AST 14 9 - 39 U/L    Bilirubin, Total 0.5 0.0 - 1.2 mg/dL    ALT 20 7 - 45 U/L   CBC and Auto Differential   Result Value Ref Range    WBC 11.4 (H) 4.4 - 11.3 x10*3/uL    nRBC 0.0 0.0 - 0.0 /100 WBCs    RBC 4.25 4.00 - 5.20 x10*6/uL    Hemoglobin 12.4 12.0 - 16.0 g/dL    Hematocrit 38.1 36.0 - 46.0 %    MCV 90 80 - 100 fL    MCH 29.2 26.0 - 34.0 pg    MCHC 32.5 32.0 - 36.0 g/dL    RDW 12.8 11.5 - 14.5 %    Platelets 296 150 - 450 x10*3/uL    Neutrophils % 67.6 40.0 - 80.0 %    Immature Granulocytes %, Automated 2.0 (H) 0.0 - 0.9 %    Lymphocytes % 17.2 13.0 - 44.0 %    Monocytes % 9.2 2.0 - 10.0 %    Eosinophils % 3.1 0.0 - 6.0 %    Basophils % 0.9 0.0 - 2.0 %    Neutrophils Absolute 7.68 1.20 - 7.70 x10*3/uL    Immature Granulocytes Absolute, Automated 0.23 0.00 - 0.70 x10*3/uL    Lymphocytes Absolute 1.96 1.20 - 4.80 x10*3/uL    Monocytes Absolute 1.05 (H) 0.10 - 1.00 x10*3/uL    Eosinophils Absolute 0.35 0.00 - 0.70 x10*3/uL    Basophils Absolute 0.10 0.00 - 0.10 x10*3/uL   Lactate   Result Value Ref Range    Lactate 0.6 0.4 - 2.0 mmol/L   Sedimentation Rate   Result Value Ref Range    Sedimentation Rate 79 (H) 0 - 30 mm/h   C-Reactive Protein   Result Value Ref Range    C-Reactive Protein 8.88 (H) <1.00 mg/dL   Blood Culture    Specimen: Peripheral Venipuncture; Blood culture   Result Value Ref Range    Blood Culture Loaded on Instrument - Culture in progress    Blood Culture    Specimen: Peripheral Venipuncture; Blood culture   Result Value Ref Range    Blood Culture Loaded on Instrument - Culture in progress        Lower extremity venous duplex left    Result Date: 9/11/2024             [place] [id number] Phone  [phone], Fax [phone]  Vascular Lab Report  [facility] US LOWER EXTREMITY VENOUS DUPLEX LEFT Patient Name:      CECILIA CHURCH VERN  Reading Physician:  [id number] Mejia Gutierrez MD Study Date:        9/11/2024           Ordering Provider:  [id number] MARAH ENGLISH MRN/PID:           [id number]            Fellow: Accession#:        [id number]        Technologist:       Antoine Huff RVLOUISA Date of Birth/Age: 1960 / 64 years Technologist 2: Gender:            F                   Encounter#:         [id number] Admission Status:  Emergency           Location Performed: [facility]  Diagnosis/ICD: Pain in left lower leg-M79.662 CPT Codes:     13488 Peripheral venous duplex scan for DVT Limited  Pertinent History: Leg pain.  CONCLUSIONS: Right Lower Venous: The right common femoral vein demonstrates normal spontaneous and respirophasic flow. Left Lower Venous: No evidence of acute deep vein thrombus visualized in the left lower extremity.  Imaging & Doppler Findings:  Right        Flow CFV   Spontaneous/Phasic  Left                  Compress Thrombus        Flow Distal External Iliac                   Spontaneous/Phasic CFV                     Yes      None   Spontaneous/Phasic PFV                     Yes      None FV Proximal             Yes      None   Spontaneous/Phasic FV Mid                  Yes      None FV Distal               Yes      None Popliteal               Yes      None   Spontaneous/Phasic Peroneal                Yes      None PTV                     Yes      None  [id number] Mejia Gutierrez MD Electronically signed by [id number] Mejia Gutierrez MD on 9/11/2024 at 5:14:59 PM  ** Final **     CT lower extremity left w IV contrast    Result Date: 9/11/2024  Interpreted By:  eYnni Zhang, STUDY: CT LOWER EXTREMITY LEFT W IV CONTRAST; ;  9/11/2024 12:10  pm   INDICATION: Signs/Symptoms:Infection of left lower leg, from the knee down.     COMPARISON: None.   ACCESSION NUMBER(S): [id number]   ORDERING CLINICIAN: MARAH ENGLISH   TECHNIQUE: Serial axial CT images obtained of the left leg following intravenous administration of 73 mL of Omnipaque 350. Images reformatted in the coronal and sagittal projection   FINDINGS: Subcutaneous tissues of the left calf demonstrate curvilinear area of fluid density within the pretibial location of the proximal left calf with subtle peripheral rim enhancement without foci of gas demonstrated. This area of fluid signal extends to the anteromedial margin of the proximal calf with collection measuring 1.0 x 5.2 x 6.9 cm in the AP, transverse and craniocaudal dimension respectively. This extends from the level of the tibial tuberosity distally along the pretibial location. Findings are in keeping with abscess formation in the appropriate clinical situation. Surrounding subcutaneous edema demonstrated. Subtle curvilinear fluid and edema about the medial calf proximally.   Generalized subcutaneous edema within the proximal calf. There is component of skin thickening and subcutaneous edema with cellulitis in the appropriate clinical situation. The edema tracks along superficial fascial plane of the proximal to mid calf. There is no edema in the inter fascial planes within the muscle compartments of the left calf. No focal fluid density or evidence for infectious myositis.   Tibia and fibula demonstrate no cortical or trabecular abnormality. Extensor mechanism is intact. Achilles tendon is unremarkable. No ankle joint effusion. Visualized popliteal vasculature as well as extending into the calf distally unremarkable.               1. Pretibial subcutaneous fluid collection from the level of the tibial tuberosity extending distally measuring 6.9 cm in the craniocaudal dimension with abscess formation of concern. No foci of gas within the  collection of the surrounding subcutaneous edema demonstrated. This suggests underlying cellulitis. Correlate with focal ulceration and fluid leaking from the collection.   2. Cellulitis within the calf with edema tracking along superficial fascial plane of the proximal to mid calf.   3. No infectious myositis.     MACRO: None   Signed by: Yenni Zhang 9/11/2024 1:55 PM Dictation workstation:   [id number]     All data reviewed by me independently     Assessment/Plan   Assessment & Plan  Cellulitis and abscess of left lower extremity    Abscess of left leg    64-year-old female with left leg cellulitis and abscess  Possible hematoma with infection  Plan  N.p.o. after midnight  I&D per Ortho in a.m.  Daily CBC BMP  Follow blood cultures  Local wound care  Vancomycin and Zosyn IV  Supportive care symptomatic management  Education counseling  Bacitracin ointment  Hydrated with normal saline  Tylenol for fever  Antiemetics if required  Morphine IV for pain, 0.5 to 1 mg IV every 4-6 hours as needed for moderate to severe pain  Cardiac diet  DVT prophylaxis addressed as per policy         Parish Rinaldi MD

## 2024-09-13 LAB
ABO GROUP (TYPE) IN BLOOD: NORMAL
ANION GAP SERPL CALC-SCNC: 11 MMOL/L (ref 10–20)
ANTIBODY SCREEN: NORMAL
BUN SERPL-MCNC: 12 MG/DL (ref 6–23)
CALCIUM SERPL-MCNC: 8.6 MG/DL (ref 8.6–10.3)
CHLORIDE SERPL-SCNC: 103 MMOL/L (ref 98–107)
CO2 SERPL-SCNC: 25 MMOL/L (ref 21–32)
CREAT SERPL-MCNC: 0.74 MG/DL (ref 0.5–1.05)
EGFRCR SERPLBLD CKD-EPI 2021: 90 ML/MIN/1.73M*2
ERYTHROCYTE [DISTWIDTH] IN BLOOD BY AUTOMATED COUNT: 12.9 % (ref 11.5–14.5)
FUNGUS SPEC CULT: NORMAL
FUNGUS SPEC CULT: NORMAL
FUNGUS SPEC FUNGUS STN: NORMAL
FUNGUS SPEC FUNGUS STN: NORMAL
GLUCOSE SERPL-MCNC: 112 MG/DL (ref 74–99)
HCT VFR BLD AUTO: 34.8 % (ref 36–46)
HGB BLD-MCNC: 11.3 G/DL (ref 12–16)
MCH RBC QN AUTO: 29.2 PG (ref 26–34)
MCHC RBC AUTO-ENTMCNC: 32.5 G/DL (ref 32–36)
MCV RBC AUTO: 90 FL (ref 80–100)
NRBC BLD-RTO: 0 /100 WBCS (ref 0–0)
PLATELET # BLD AUTO: 333 X10*3/UL (ref 150–450)
POTASSIUM SERPL-SCNC: 3.8 MMOL/L (ref 3.5–5.3)
RBC # BLD AUTO: 3.87 X10*6/UL (ref 4–5.2)
RH FACTOR (ANTIGEN D): NORMAL
SODIUM SERPL-SCNC: 135 MMOL/L (ref 136–145)
VANCOMYCIN SERPL-MCNC: 22.5 UG/ML (ref 5–20)
WBC # BLD AUTO: 11.1 X10*3/UL (ref 4.4–11.3)

## 2024-09-13 PROCEDURE — 99232 SBSQ HOSP IP/OBS MODERATE 35: CPT | Performed by: HOSPITALIST

## 2024-09-13 PROCEDURE — 80202 ASSAY OF VANCOMYCIN: CPT | Performed by: HOSPITALIST

## 2024-09-13 PROCEDURE — 36415 COLL VENOUS BLD VENIPUNCTURE: CPT | Performed by: HOSPITALIST

## 2024-09-13 PROCEDURE — 2500000004 HC RX 250 GENERAL PHARMACY W/ HCPCS (ALT 636 FOR OP/ED): Performed by: HOSPITALIST

## 2024-09-13 PROCEDURE — 82310 ASSAY OF CALCIUM: CPT | Performed by: HOSPITALIST

## 2024-09-13 PROCEDURE — 85027 COMPLETE CBC AUTOMATED: CPT | Performed by: HOSPITALIST

## 2024-09-13 PROCEDURE — 86901 BLOOD TYPING SEROLOGIC RH(D): CPT | Performed by: HOSPITALIST

## 2024-09-13 PROCEDURE — 1100000001 HC PRIVATE ROOM DAILY

## 2024-09-13 RX ORDER — ENOXAPARIN SODIUM 100 MG/ML
40 INJECTION SUBCUTANEOUS DAILY
Status: DISCONTINUED | OUTPATIENT
Start: 2024-09-13 | End: 2024-09-17 | Stop reason: HOSPADM

## 2024-09-13 ASSESSMENT — COGNITIVE AND FUNCTIONAL STATUS - GENERAL
MOBILITY SCORE: 24
DAILY ACTIVITIY SCORE: 24

## 2024-09-13 ASSESSMENT — PAIN - FUNCTIONAL ASSESSMENT
PAIN_FUNCTIONAL_ASSESSMENT: 0-10

## 2024-09-13 ASSESSMENT — PAIN DESCRIPTION - ORIENTATION: ORIENTATION: LEFT

## 2024-09-13 ASSESSMENT — PAIN SCALES - GENERAL
PAINLEVEL_OUTOF10: 0 - NO PAIN
PAINLEVEL_OUTOF10: 8
PAINLEVEL_OUTOF10: 0 - NO PAIN

## 2024-09-13 NOTE — CARE PLAN
The patient's goals for the shift include no falls    The clinical goals for the shift include Pain control      Problem: Skin  Goal: Decreased wound size/increased tissue granulation at next dressing change  Outcome: Progressing  Goal: Participates in plan/prevention/treatment measures  Outcome: Progressing  Goal: Promote/optimize nutrition  Outcome: Progressing  Goal: Promote skin healing  Outcome: Progressing     Problem: Pain - Adult  Goal: Verbalizes/displays adequate comfort level or baseline comfort level  Outcome: Progressing     Problem: Safety - Adult  Goal: Free from fall injury  Outcome: Progressing     Problem: Discharge Planning  Goal: Discharge to home or other facility with appropriate resources  Outcome: Progressing     Problem: Chronic Conditions and Co-morbidities  Goal: Patient's chronic conditions and co-morbidity symptoms are monitored and maintained or improved  Outcome: Progressing     Problem: Pain  Goal: Takes deep breaths with improved pain control throughout the shift  Outcome: Progressing  Goal: Turns in bed with improved pain control throughout the shift  Outcome: Progressing  Goal: Walks with improved pain control throughout the shift  Outcome: Progressing  Goal: Performs ADL's with improved pain control throughout shift  Outcome: Progressing  Goal: Participates in PT with improved pain control throughout the shift  Outcome: Progressing  Goal: Free from opioid side effects throughout the shift  Outcome: Progressing  Goal: Free from acute confusion related to pain meds throughout the shift  Outcome: Progressing

## 2024-09-13 NOTE — ANESTHESIA POSTPROCEDURE EVALUATION
Patient: Belgica Pinto    Procedure Summary       Date: 09/12/24 Room / Location: [facility] [place] OR 01 / Virtual [facility] [place] [facility]    Anesthesia Start: 1437 Anesthesia Stop: 1540    Procedure: Lower Extremity I  and  D (Left: Leg Lower) Diagnosis:       Abscess of left leg      (Abscess of left leg [L02.416])    Surgeons: Ramone Cedeño MD Responsible Provider: Dion Velazquez MD PhD    Anesthesia Type: general ASA Status: 2            Anesthesia Type: general    Vitals Value Taken Time   /62 09/12/24 2058   Temp 35.8 °C (96.4 °F) 09/12/24 2058   Pulse 66 09/12/24 2058   Resp 16 09/12/24 2058   SpO2 93 % 09/12/24 2058       Anesthesia Post Evaluation    Patient location during evaluation: PACU  Patient participation: waiting for patient participation  Level of consciousness: awake and alert  Pain management: satisfactory to patient  Airway patency: patent  Cardiovascular status: hemodynamically stable  Respiratory status: spontaneous ventilation and unassisted  Hydration status: euvolemic  Postoperative Nausea and Vomiting: none        No notable events documented.

## 2024-09-13 NOTE — PROGRESS NOTES
"Belgica Pinto is a 64 y.o. female on day 2 of admission presenting with Cellulitis and abscess of left lower extremity.    Subjective   POD # 1 doing well early post op.  Mild pain.         Objective     Physical Exam  Moderate drainage c/w s/p I+D  NV intact distally  No ascending lymphangitis.  About 1/3 of packing was d/c'ed    1 + Staph Aureus on cultures   Last Recorded Vitals  Blood pressure 119/62, pulse 58, temperature 36.3 °C (97.3 °F), temperature source Temporal, resp. rate 15, height 1.549 m (5' 1\"), weight 74.9 kg (165 lb 2 oz), SpO2 96%.  Intake/Output last 3 Shifts:  I/O last 3 completed shifts:  In: 4597 (61.4 mL/kg) [I.V.:3897 (52 mL/kg); IV Piggyback:700]  Out: - (0 mL/kg)   Weight: 74.9 kg     Relevant Results      Scheduled medications  piperacillin-tazobactam, 3.375 g, intravenous, q6h  polyethylene glycol, 17 g, oral, Daily  vancomycin, 1,000 mg, intravenous, q12h      Continuous medications     PRN medications  PRN medications: acetaminophen **OR** acetaminophen **OR** acetaminophen, acetaminophen **OR** acetaminophen **OR** acetaminophen, morphine, morphine, ondansetron ODT **OR** ondansetron, vancomycin  Results for orders placed or performed during the hospital encounter of 09/11/24 (from the past 24 hour(s))   Type And Screen   Result Value Ref Range    ABO TYPE O     Rh TYPE POS     ANTIBODY SCREEN NEG    Vancomycin   Result Value Ref Range    Vancomycin 22.5 (H) 5.0 - 20.0 ug/mL   CBC   Result Value Ref Range    WBC 11.1 4.4 - 11.3 x10*3/uL    nRBC 0.0 0.0 - 0.0 /100 WBCs    RBC 3.87 (L) 4.00 - 5.20 x10*6/uL    Hemoglobin 11.3 (L) 12.0 - 16.0 g/dL    Hematocrit 34.8 (L) 36.0 - 46.0 %    MCV 90 80 - 100 fL    MCH 29.2 26.0 - 34.0 pg    MCHC 32.5 32.0 - 36.0 g/dL    RDW 12.9 11.5 - 14.5 %    Platelets 333 150 - 450 x10*3/uL   Basic metabolic panel   Result Value Ref Range    Glucose 112 (H) 74 - 99 mg/dL    Sodium 135 (L) 136 - 145 mmol/L    Potassium 3.8 3.5 - 5.3 mmol/L    Chloride " 103 98 - 107 mmol/L    Bicarbonate 25 21 - 32 mmol/L    Anion Gap 11 10 - 20 mmol/L    Urea Nitrogen 12 6 - 23 mg/dL    Creatinine 0.74 0.50 - 1.05 mg/dL    eGFR 90 >60 mL/min/1.73m*2    Calcium 8.6 8.6 - 10.3 mg/dL                            Assessment/Plan   Assessment & Plan  Cellulitis and abscess of left lower extremity    Abscess of left leg    S/p I+D of left leg abscess.      Cont IV antibiotics   Await sensitivities.       I spent 37 minutes in the professional and overall care of this patient.      Ramone Cedeño MD

## 2024-09-13 NOTE — PROGRESS NOTES
Care Transitions: Met with patient at bedside. Verified discharge plan is home, denies any needs or in home services. States if she has wound care dressing change to LLE she will do it herself or have her daughter help her. Denies the need for home care. Medicare IMM reviewed, patient signed, copy provided, original placed in chart. Voiced understanding.  -1030 Patient reviewed in care round meeting this AM. ADOD 24 hours. Care team to follow. Julia Keller RN/TCC

## 2024-09-13 NOTE — PROGRESS NOTES
Vancomycin Dosing by Pharmacy- FOLLOW UP    Belgica Pinto is a 64 y.o. year old female who Pharmacy has been consulted for vancomycin dosing for cellulitis, skin and soft tissue. Based on the patient's indication and renal status this patient is being dosed based on a goal AUC of 400-600.     Renal function is currently stable.    Current vancomycin dose: 1000 mg given every 12 hours    Most recent random level: 22.5 mcg/mL    Visit Vitals  /77   Pulse 59   Temp 36.7 °C (98.1 °F) (Temporal)   Resp 16        Lab Results   Component Value Date    CREATININE 0.74 2024    CREATININE 0.70 2024    CREATININE 0.63 2024        Patient weight is as follows:   Vitals:    24 1709   Weight: 74.9 kg (165 lb 2 oz)       Cultures:  No results found for the encounter in last 14 days.  Blood cultures show no growth at 1 day  Tissue cultures are still in progress     I/O last 3 completed shifts:  In: 4597 (61.4 mL/kg) [I.V.:3897 (52 mL/kg); IV Piggyback:700]  Out: - (0 mL/kg)   Weight: 74.9 kg   I/O during current shift:  I/O this shift:  In: 495 [P.O.:240; I.V.:255]  Out: -     Temp (24hrs), Av.5 °C (97.7 °F), Min:35.8 °C (96.4 °F), Max:36.8 °C (98.2 °F)      Assessment/Plan    Within goal AUC range. Continue current vancomycin regimen.    This dosing regimen is predicted by [employer] to result in the following pharmacokinetic parameters:  Regimen: 1000 mg IV every 12 hours.  Start time: 13:20 on 2024  Exposure target: AUC24 (range)400-600 mg/L.hr   [id number]: 499 mg/L.hr  AUC24,ss: 516 mg/L.hr  Probability of AUC24 > 400: 95 %  Ctrough,ss: 14.2 mg/L  Probability of Ctrough,ss > 20: 6 %      Patient currently on both Vanco and Zosyn. Will de-escalate once cultures result  The next level will be obtained on 09/15 at 0500. May be obtained sooner if clinically indicated.   Will continue to monitor renal function daily while on vancomycin and order serum creatinine at least every 48 hours if  not already ordered.  Follow for continued vancomycin needs, clinical response, and signs/symptoms of toxicity.       Vale Minor, PharmD

## 2024-09-13 NOTE — PROGRESS NOTES
"Belgica Pinto is a 64 y.o. female on day 2 of admission presenting with Cellulitis and abscess of left lower extremity.    Subjective   Pain in left leg, overall better  Worsens with movement or touching  Dressing in place with bandage         Objective     Physical Exam  General Appearance: AAO x 3,   Skin: skin color pink, warm, and dry; no suspicious rashes or lesions  Eyes : PERRL, EOM's intact  ENT: mucous membranes pink and moist  Neck: normocephalic  Respiratory: lungs clear to auscultation anteriorly; no wheezing, rhonchi, or crackles.   Heart: regular rate and rhythm.   Abdomen: Nondistended, positive bowel sounds x4, soft,  nontender  Extremities: Left leg in dressing  Peripheral pulses: normal x4 extremities  Neuro: alert, coherent and conversant, no focal motor deficits  Last Recorded Vitals  Blood pressure 119/62, pulse 58, temperature 36.3 °C (97.3 °F), temperature source Temporal, resp. rate 15, height 1.549 m (5' 1\"), weight 74.9 kg (165 lb 2 oz), SpO2 96%.  Intake/Output last 3 Shifts:  I/O last 3 completed shifts:  In: 4597 (61.4 mL/kg) [I.V.:3897 (52 mL/kg); IV Piggyback:700]  Out: - (0 mL/kg)   Weight: 74.9 kg     Relevant Results  Scheduled medications  piperacillin-tazobactam, 3.375 g, intravenous, q6h  polyethylene glycol, 17 g, oral, Daily  vancomycin, 1,000 mg, intravenous, q12h      Continuous medications     PRN medications  PRN medications: acetaminophen **OR** acetaminophen **OR** acetaminophen, acetaminophen **OR** acetaminophen **OR** acetaminophen, morphine, morphine, ondansetron ODT **OR** ondansetron, vancomycin    Results for orders placed or performed during the hospital encounter of 09/11/24 (from the past 24 hour(s))   Type And Screen   Result Value Ref Range    ABO TYPE O     Rh TYPE POS     ANTIBODY SCREEN NEG    Vancomycin   Result Value Ref Range    Vancomycin 22.5 (H) 5.0 - 20.0 ug/mL   CBC   Result Value Ref Range    WBC 11.1 4.4 - 11.3 x10*3/uL    nRBC 0.0 0.0 - 0.0 " /100 WBCs    RBC 3.87 (L) 4.00 - 5.20 x10*6/uL    Hemoglobin 11.3 (L) 12.0 - 16.0 g/dL    Hematocrit 34.8 (L) 36.0 - 46.0 %    MCV 90 80 - 100 fL    MCH 29.2 26.0 - 34.0 pg    MCHC 32.5 32.0 - 36.0 g/dL    RDW 12.9 11.5 - 14.5 %    Platelets 333 150 - 450 x10*3/uL   Basic metabolic panel   Result Value Ref Range    Glucose 112 (H) 74 - 99 mg/dL    Sodium 135 (L) 136 - 145 mmol/L    Potassium 3.8 3.5 - 5.3 mmol/L    Chloride 103 98 - 107 mmol/L    Bicarbonate 25 21 - 32 mmol/L    Anion Gap 11 10 - 20 mmol/L    Urea Nitrogen 12 6 - 23 mg/dL    Creatinine 0.74 0.50 - 1.05 mg/dL    eGFR 90 >60 mL/min/1.73m*2    Calcium 8.6 8.6 - 10.3 mg/dL                   Assessment/Plan   Assessment & Plan  Cellulitis and abscess of left lower extremity    Abscess of left leg      64-year-old female with left leg cellulitis and abscess  Plan  Status post I&D by Ortho  Daily CBC BMP  Follow culture sensitivities  Local wound care  On vancomycin and Zosyn IV  Supportive care symptomatic management  Education counseling  Tylenol for fever  Antiemetics if required  On morphine IV 0.5 to 1 mg every 4-6 hours as needed for moderate to severe pain  Cardiac diet  DVT prophylaxis per policy, Lovenox daily  Monitor vitals                                        Parish Rinaldi MD

## 2024-09-14 LAB
ANION GAP SERPL CALC-SCNC: 12 MMOL/L (ref 10–20)
BACTERIA SPEC CULT: ABNORMAL
BACTERIA SPEC CULT: ABNORMAL
BUN SERPL-MCNC: 17 MG/DL (ref 6–23)
CALCIUM SERPL-MCNC: 8.5 MG/DL (ref 8.6–10.3)
CHLORIDE SERPL-SCNC: 107 MMOL/L (ref 98–107)
CO2 SERPL-SCNC: 26 MMOL/L (ref 21–32)
CREAT SERPL-MCNC: 0.88 MG/DL (ref 0.5–1.05)
CRP SERPL-MCNC: 2.61 MG/DL
EGFRCR SERPLBLD CKD-EPI 2021: 73 ML/MIN/1.73M*2
ERYTHROCYTE [DISTWIDTH] IN BLOOD BY AUTOMATED COUNT: 13.2 % (ref 11.5–14.5)
ERYTHROCYTE [SEDIMENTATION RATE] IN BLOOD BY WESTERGREN METHOD: 54 MM/H (ref 0–30)
GLUCOSE SERPL-MCNC: 89 MG/DL (ref 74–99)
GRAM STN SPEC: ABNORMAL
HCT VFR BLD AUTO: 34.4 % (ref 36–46)
HGB BLD-MCNC: 11.1 G/DL (ref 12–16)
MCH RBC QN AUTO: 29.4 PG (ref 26–34)
MCHC RBC AUTO-ENTMCNC: 32.3 G/DL (ref 32–36)
MCV RBC AUTO: 91 FL (ref 80–100)
NRBC BLD-RTO: 0 /100 WBCS (ref 0–0)
PLATELET # BLD AUTO: 311 X10*3/UL (ref 150–450)
POTASSIUM SERPL-SCNC: 3.8 MMOL/L (ref 3.5–5.3)
RBC # BLD AUTO: 3.77 X10*6/UL (ref 4–5.2)
SODIUM SERPL-SCNC: 141 MMOL/L (ref 136–145)
VANCOMYCIN SERPL-MCNC: 38.3 UG/ML (ref 5–20)
WBC # BLD AUTO: 10.5 X10*3/UL (ref 4.4–11.3)

## 2024-09-14 PROCEDURE — 85652 RBC SED RATE AUTOMATED: CPT | Performed by: SPECIALIST

## 2024-09-14 PROCEDURE — 80048 BASIC METABOLIC PNL TOTAL CA: CPT | Performed by: HOSPITALIST

## 2024-09-14 PROCEDURE — 2500000004 HC RX 250 GENERAL PHARMACY W/ HCPCS (ALT 636 FOR OP/ED): Performed by: STUDENT IN AN ORGANIZED HEALTH CARE EDUCATION/TRAINING PROGRAM

## 2024-09-14 PROCEDURE — 85027 COMPLETE CBC AUTOMATED: CPT | Performed by: HOSPITALIST

## 2024-09-14 PROCEDURE — 80202 ASSAY OF VANCOMYCIN: CPT | Performed by: HOSPITALIST

## 2024-09-14 PROCEDURE — 86140 C-REACTIVE PROTEIN: CPT | Performed by: SPECIALIST

## 2024-09-14 PROCEDURE — 1100000001 HC PRIVATE ROOM DAILY

## 2024-09-14 PROCEDURE — 99232 SBSQ HOSP IP/OBS MODERATE 35: CPT | Performed by: STUDENT IN AN ORGANIZED HEALTH CARE EDUCATION/TRAINING PROGRAM

## 2024-09-14 PROCEDURE — 2500000004 HC RX 250 GENERAL PHARMACY W/ HCPCS (ALT 636 FOR OP/ED): Performed by: HOSPITALIST

## 2024-09-14 PROCEDURE — 36415 COLL VENOUS BLD VENIPUNCTURE: CPT | Performed by: HOSPITALIST

## 2024-09-14 PROCEDURE — 2500000001 HC RX 250 WO HCPCS SELF ADMINISTERED DRUGS (ALT 637 FOR MEDICARE OP): Performed by: INTERNAL MEDICINE

## 2024-09-14 RX ORDER — DIPHENHYDRAMINE HCL 25 MG
25 CAPSULE ORAL ONCE
Status: COMPLETED | OUTPATIENT
Start: 2024-09-14 | End: 2024-09-14

## 2024-09-14 RX ORDER — KETOROLAC TROMETHAMINE 30 MG/ML
15 INJECTION, SOLUTION INTRAMUSCULAR; INTRAVENOUS EVERY 6 HOURS PRN
Status: DISCONTINUED | OUTPATIENT
Start: 2024-09-14 | End: 2024-09-17 | Stop reason: HOSPADM

## 2024-09-14 ASSESSMENT — COGNITIVE AND FUNCTIONAL STATUS - GENERAL
DAILY ACTIVITIY SCORE: 24
CLIMB 3 TO 5 STEPS WITH RAILING: A LITTLE
MOVING TO AND FROM BED TO CHAIR: A LITTLE
MOBILITY SCORE: 23
WALKING IN HOSPITAL ROOM: A LITTLE
MOBILITY SCORE: 19
STANDING UP FROM CHAIR USING ARMS: A LITTLE
CLIMB 3 TO 5 STEPS WITH RAILING: A LOT

## 2024-09-14 ASSESSMENT — PAIN SCALES - GENERAL
PAINLEVEL_OUTOF10: 3
PAINLEVEL_OUTOF10: 0 - NO PAIN
PAINLEVEL_OUTOF10: 3
PAINLEVEL_OUTOF10: 5 - MODERATE PAIN
PAINLEVEL_OUTOF10: 0 - NO PAIN
PAINLEVEL_OUTOF10: 0 - NO PAIN

## 2024-09-14 ASSESSMENT — PAIN - FUNCTIONAL ASSESSMENT: PAIN_FUNCTIONAL_ASSESSMENT: 0-10

## 2024-09-14 ASSESSMENT — PAIN DESCRIPTION - LOCATION: LOCATION: LEG

## 2024-09-14 NOTE — PROGRESS NOTES
Vancomycin Dosing by Pharmacy- FOLLOW UP    Belgica Pinto is a 64 y.o. year old female who Pharmacy has been consulted for vancomycin dosing for cellulitis, skin and soft tissue. Based on the patient's indication and renal status this patient is being dosed based on a goal AUC of 400-600.     Renal function is currently declining.    Current vancomycin dose: 1000 mg given every 12 hours    Most recent random level: 38.3 mcg/mL    Visit Vitals  /71 (BP Location: Right arm, Patient Position: Sitting)   Pulse 66   Temp 36.3 °C (97.3 °F) (Temporal)   Resp 18        Lab Results   Component Value Date    CREATININE 0.88 2024    CREATININE 0.74 2024    CREATININE 0.70 2024    CREATININE 0.63 2024        Patient weight is as follows:   Vitals:    24 1709   Weight: 74.9 kg (165 lb 2 oz)       Cultures:  Susceptibility data for the encounter in last 14 days.  Collected Specimen Info Organism   24 Swab from ABSCESS Staphylococcus aureus   24 Swab from ABSCESS Staphylococcus aureus        I/O last 3 completed shifts:  In: 2335 (31.2 mL/kg) [P.O.:1180; I.V.:255 (3.4 mL/kg); IV Piggyback:900]  Out: - (0 mL/kg)   Weight: 74.9 kg   I/O during current shift:  No intake/output data recorded.    Temp (24hrs), Av.2 °C (97.1 °F), Min:36 °C (96.8 °F), Max:36.3 °C (97.3 °F)      Assessment/Plan    Above goal AUC. Orders placed for new vancomcyin regimen of 750 mg every 12 hours to begin at 1400 on .    This dosing regimen is predicted by [employer] to result in the following pharmacokinetic parameters:    Regimen: 750 mg IV every 12 hours.  Start time: 11:37 on 2024  Exposure target: AUC24 (range)400-600 mg/L.hr   [id number]: 593 mg/L.hr  AUC24,ss: 580 mg/L.hr  Probability of AUC24 > 400: 100 %  Ctrough,ss: 18.3 mg/L  Probability of Ctrough,ss > 20: 26 %      The next level will be obtained on 9/15 at morning labs. May be obtained sooner if clinically indicated.   Will  continue to monitor renal function daily while on vancomycin and order serum creatinine at least every 48 hours if not already ordered.  Follow for continued vancomycin needs, clinical response, and signs/symptoms of toxicity.       Tegan Pruitt, RP

## 2024-09-14 NOTE — SIGNIFICANT EVENT
Patient developing localized rash along her left thigh.  No rash along the torso .  I will give Benadryl 25 mg oral one-time dose

## 2024-09-14 NOTE — PROGRESS NOTES
Cecilia Pinto is a 64 y.o. female on day 3 of admission presenting with Cellulitis and abscess of left lower extremity.      Subjective     Patient seen and examined at bedside.  She complains of pain during dressing.  She has soakage of her left lower extremity  Objective     Last Recorded Vitals  /71 (BP Location: Right arm, Patient Position: Sitting)   Pulse 66   Temp 36.3 °C (97.3 °F) (Temporal)   Resp 18   Wt 74.9 kg (165 lb 2 oz)   SpO2 95%   Intake/Output last 3 Shifts:    Intake/Output Summary (Last 24 hours) at 9/14/2024 1327  Last data filed at 9/14/2024 1226  Gross per 24 hour   Intake 600 ml   Output --   Net 600 ml       Admission Weight  Weight: 77.1 kg (170 lb) (09/11/24 0948)    Daily Weight  09/11/24 : 74.9 kg (165 lb 2 oz)    Image Results  Lower extremity venous duplex [place] [address]  Phone [phone], Fax [phone]       Vascular Lab Report     [facility] LOWER EXTREMITY VENOUS DUPLEX LEFT    Patient Name:      CECILIA PINTO  Reading Physician:  [id number] Mejia Gutierrez MD  Study Date:        9/11/2024           Ordering Provider:  [id number] MARAH ENGLISH  MRN/PID:           [id number]            Fellow:  Accession#:        [id number]        Technologist:       Antoine Huff RVT  Date of Birth/Age: 1960 / 64 years Technologist 2:  Gender:            F                   Encounter#:         [id number]  Admission Status:  Emergency           Location Performed: [facility]       Diagnosis/ICD: Pain in left lower leg-M79.662  CPT Codes:     91902 Peripheral venous duplex scan for DVT Limited       Pertinent History: Leg pain.       CONCLUSIONS:  Right Lower Venous: The right common femoral vein demonstrates normal spontaneous and respirophasic flow.  Left  Lower Venous: No evidence of acute deep vein thrombus visualized in the left lower extremity.     Imaging & Doppler Findings:     Right        Flow  CFV   Spontaneous/Phasic       Left                  Compress Thrombus        Flow  Distal External Iliac                   Spontaneous/Phasic  CFV                     Yes      None   Spontaneous/Phasic  PFV                     Yes      None  FV Proximal             Yes      None   Spontaneous/Phasic  FV Mid                  Yes      None  FV Distal               Yes      None  Popliteal               Yes      None   Spontaneous/Phasic  Peroneal                Yes      None  PTV                     Yes      None       [id number] Mejia Gutierrez MD  Electronically signed by [id number] Mejia Gutierrez MD on 9/11/2024 at 5:14:59 PM       ** Final **  CT lower extremity left w IV contrast  Narrative: Interpreted By:  Yenni Zhang,   STUDY:  CT LOWER EXTREMITY LEFT W IV CONTRAST; ;  9/11/2024 12:10 pm      INDICATION:  Signs/Symptoms:Infection of left lower leg, from the knee down.          COMPARISON:  None.      ACCESSION NUMBER(S):  [id number]      ORDERING CLINICIAN:  MARAH ENGLISH      TECHNIQUE:  Serial axial CT images obtained of the left leg following intravenous  administration of 73 mL of Omnipaque 350. Images reformatted in the  coronal and sagittal projection      FINDINGS:  Subcutaneous tissues of the left calf demonstrate curvilinear area of  fluid density within the pretibial location of the proximal left calf  with subtle peripheral rim enhancement without foci of gas  demonstrated. This area of fluid signal extends to the anteromedial  margin of the proximal calf with collection measuring 1.0 x 5.2 x 6.9  cm in the AP, transverse and craniocaudal dimension respectively.  This extends from the level of the tibial tuberosity distally along  the pretibial location. Findings are in keeping with abscess  formation in the appropriate clinical  situation. Surrounding  subcutaneous edema demonstrated. Subtle curvilinear fluid and edema  about the medial calf proximally.      Generalized subcutaneous edema within the proximal calf. There is  component of skin thickening and subcutaneous edema with cellulitis  in the appropriate clinical situation. The edema tracks along  superficial fascial plane of the proximal to mid calf. There is no  edema in the inter fascial planes within the muscle compartments of  the left calf. No focal fluid density or evidence for infectious  myositis.      Tibia and fibula demonstrate no cortical or trabecular abnormality.  Extensor mechanism is intact. Achilles tendon is unremarkable. No  ankle joint effusion. Visualized popliteal vasculature as well as  extending into the calf distally unremarkable.                      Impression: 1. Pretibial subcutaneous fluid collection from the level of the  tibial tuberosity extending distally measuring 6.9 cm in the  craniocaudal dimension with abscess formation of concern. No foci of  gas within the collection of the surrounding subcutaneous edema  demonstrated. This suggests underlying cellulitis. Correlate with  focal ulceration and fluid leaking from the collection.      2. Cellulitis within the calf with edema tracking along superficial  fascial plane of the proximal to mid calf.      3. No infectious myositis.          MACRO:  None      Signed by: Yenni Zhang 9/11/2024 1:55 PM  Dictation workstation:   [id number]      Physical Exam  Constitutional:       Appearance: Normal appearance.   HENT:      Head: Normocephalic and atraumatic.      Right Ear: Tympanic membrane normal.      Nose: Nose normal.      Mouth/Throat:      Mouth: Mucous membranes are moist.   Eyes:      Pupils: Pupils are equal, round, and reactive to light.   Cardiovascular:      Rate and Rhythm: Normal rate and regular rhythm.   Pulmonary:      Effort: Pulmonary effort is normal.      Breath sounds: Normal  breath sounds.   Abdominal:      Palpations: Abdomen is soft.   Musculoskeletal:         General: Swelling and tenderness present.      Cervical back: Normal range of motion and neck supple.      Comments: Left leg    Neurological:      General: No focal deficit present.      Mental Status: She is alert.   Psychiatric:         Mood and Affect: Mood normal.         Relevant Results           Scheduled medications  enoxaparin, 40 mg, subcutaneous, Daily  polyethylene glycol, 17 g, oral, Daily  vancomycin, 750 mg, intravenous, q12h      Continuous medications     PRN medications  PRN medications: acetaminophen **OR** acetaminophen **OR** acetaminophen, acetaminophen **OR** acetaminophen **OR** acetaminophen, ketorolac, morphine, morphine, ondansetron ODT **OR** ondansetron, vancomycin      Assessment/Plan        64-year-old female with no significant past medical history presents with left lower extremity cellulitis s/p I&D    Patient is healing well  There was some soakage in her dressing.  Ortho on board for daily dressing  Patient will have wound VAC per orthopedic  Patient tissue culture growing MRSA.  Will keep vancomycin and discontinue Zosyn  Based on sensitivities we can de-escalate to oral antibiotics at discharge.  Toradol ordered for pain  Avoid narcotics plan for discharge on Monday after orthopedic review    DVT prophylaxis: Lovenox  GI prophylaxis not required  Full code                      Jay Enriquez MD

## 2024-09-14 NOTE — CARE PLAN
The patient's goals for the shift include no falls, healing, go home    The clinical goals for the shift include Pain control, increased comfort

## 2024-09-15 VITALS
TEMPERATURE: 97.5 F | HEIGHT: 61 IN | SYSTOLIC BLOOD PRESSURE: 115 MMHG | HEART RATE: 67 BPM | RESPIRATION RATE: 18 BRPM | WEIGHT: 165.12 LBS | OXYGEN SATURATION: 94 % | DIASTOLIC BLOOD PRESSURE: 70 MMHG | BODY MASS INDEX: 31.18 KG/M2

## 2024-09-15 LAB
BACTERIA BLD CULT: NORMAL
BACTERIA BLD CULT: NORMAL
HOLD SPECIMEN: NORMAL
VANCOMYCIN SERPL-MCNC: 24.7 UG/ML (ref 5–20)

## 2024-09-15 PROCEDURE — 2500000004 HC RX 250 GENERAL PHARMACY W/ HCPCS (ALT 636 FOR OP/ED): Performed by: HOSPITALIST

## 2024-09-15 PROCEDURE — 99232 SBSQ HOSP IP/OBS MODERATE 35: CPT | Performed by: STUDENT IN AN ORGANIZED HEALTH CARE EDUCATION/TRAINING PROGRAM

## 2024-09-15 PROCEDURE — 1100000001 HC PRIVATE ROOM DAILY

## 2024-09-15 PROCEDURE — 36415 COLL VENOUS BLD VENIPUNCTURE: CPT

## 2024-09-15 PROCEDURE — 2500000004 HC RX 250 GENERAL PHARMACY W/ HCPCS (ALT 636 FOR OP/ED): Performed by: STUDENT IN AN ORGANIZED HEALTH CARE EDUCATION/TRAINING PROGRAM

## 2024-09-15 PROCEDURE — 80202 ASSAY OF VANCOMYCIN: CPT

## 2024-09-15 ASSESSMENT — COGNITIVE AND FUNCTIONAL STATUS - GENERAL
CLIMB 3 TO 5 STEPS WITH RAILING: A LITTLE
MOBILITY SCORE: 23
DAILY ACTIVITIY SCORE: 24
CLIMB 3 TO 5 STEPS WITH RAILING: A LITTLE
MOBILITY SCORE: 23

## 2024-09-15 ASSESSMENT — PAIN - FUNCTIONAL ASSESSMENT
PAIN_FUNCTIONAL_ASSESSMENT: 0-10

## 2024-09-15 ASSESSMENT — PAIN DESCRIPTION - ORIENTATION: ORIENTATION: LEFT

## 2024-09-15 ASSESSMENT — PAIN DESCRIPTION - LOCATION: LOCATION: LEG

## 2024-09-15 ASSESSMENT — PAIN SCALES - GENERAL
PAINLEVEL_OUTOF10: 1
PAINLEVEL_OUTOF10: 0 - NO PAIN
PAINLEVEL_OUTOF10: 5 - MODERATE PAIN
PAINLEVEL_OUTOF10: 2
PAINLEVEL_OUTOF10: 3
PAINLEVEL_OUTOF10: 2

## 2024-09-15 NOTE — PROGRESS NOTES
Vancomycin Dosing by Pharmacy- FOLLOW UP    Belgica Pinto is a 64 y.o. year old female who Pharmacy has been consulted for vancomycin dosing for cellulitis, skin and soft tissue. Based on the patient's indication and renal status this patient is being dosed based on a goal AUC of 400-600.     Renal function is currently declining. No new labs were ordered for 9/15. Will order a BMP for  to assess renal function trend.     Current vancomycin dose: 750 mg given every 12 hours    Most recent random level: 24.7  mcg/mL    Visit Vitals  /76 (BP Location: Right arm, Patient Position: Sitting)   Pulse 66   Temp 36.1 °C (97 °F) (Temporal)   Resp 18        Lab Results   Component Value Date    CREATININE 0.88 2024    CREATININE 0.74 2024    CREATININE 0.70 2024    CREATININE 0.63 2024        Patient weight is as follows:   Vitals:    24 1709   Weight: 74.9 kg (165 lb 2 oz)       Cultures:  Susceptibility data for the encounter in last 14 days.  Collected Specimen Info Organism Clindamycin Erythromycin Oxacillin Tetracycline Trimethoprim/Sulfamethoxazole Vancomycin   24 Swab from ABSCESS Staphylococcus aureus         24 Swab from ABSCESS Methicillin Resistant Staphylococcus aureus (MRSA)  S  R  R  S  S  S        I/O last 3 completed shifts:  In: 1225 (16.4 mL/kg) [P.O.:360; IV Piggyback:865]  Out: - (0 mL/kg)   Weight: 74.9 kg   I/O during current shift:  No intake/output data recorded.    Temp (24hrs), Av.4 °C (97.6 °F), Min:36.1 °C (97 °F), Max:36.8 °C (98.2 °F)      Assessment/Plan    Within goal AUC range. Continue current vancomycin regimen.    This dosing regimen is predicted by [employer] to result in the following pharmacokinetic parameters:      Regimen: 750 mg IV every 12 hours.  Start time: 14:17 on 09/15/2024  Exposure target: AUC24 (range)400-600 mg/L.hr   [id number]: 544 mg/L.hr  AUC24,ss: 540 mg/L.hr  Probability of AUC24 > 400: 100 %  Ctrough,ss: 16.7  mg/L  Probability of Ctrough,ss > 20: 9 %    The next level will be obtained on 9/22 at am labs. May be obtained sooner if clinically indicated.   Will continue to monitor renal function daily while on vancomycin and order serum creatinine at least every 48 hours if not already ordered.  Follow for continued vancomycin needs, clinical response, and signs/symptoms of toxicity.       Tegan Pruitt, [facility]

## 2024-09-15 NOTE — PROGRESS NOTES
Cecilia Pinto is a 64 y.o. female on day 4 of admission presenting with Cellulitis and abscess of left lower extremity.      Subjective     Patient seen and examined at bedside.  She complains of pain during dressing.  She has soakage of her left lower extremity  Objective     Last Recorded Vitals  /76 (BP Location: Right arm, Patient Position: Sitting)   Pulse 66   Temp 36.1 °C (97 °F) (Temporal)   Resp 18   Wt 74.9 kg (165 lb 2 oz)   SpO2 97%   Intake/Output last 3 Shifts:    Intake/Output Summary (Last 24 hours) at 9/15/2024 1326  Last data filed at 9/15/2024 0900  Gross per 24 hour   Intake 625 ml   Output --   Net 625 ml       Admission Weight  Weight: 77.1 kg (170 lb) (09/11/24 0948)    Daily Weight  09/11/24 : 74.9 kg (165 lb 2 oz)    Image Results  Lower extremity venous duplex [place] [address]  Phone [phone] [phone], Fax [phone]       Vascular Lab Report     [facility] US LOWER EXTREMITY VENOUS DUPLEX LEFT    Patient Name:      CECILIA PINTO  Reading Physician:  [id number] Mejia Gutierrez MD  Study Date:        9/11/2024           Ordering Provider:  [id number] MARAH ENGLISH  MRN/PID:           [id number]            Fellow:  Accession#:        [id number]        Technologist:       Antoine Huff RVT  Date of Birth/Age: 1960 / 64 years Technologist 2:  Gender:            F                   Encounter#:         [id number]  Admission Status:  Emergency           Location Performed: [facility]       Diagnosis/ICD: Pain in left lower leg-M79.662  CPT Codes:     36649 Peripheral venous duplex scan for DVT Limited       Pertinent History: Leg pain.       CONCLUSIONS:  Right Lower Venous: The right common femoral vein demonstrates normal spontaneous and respirophasic flow.  Left Lower  Venous: No evidence of acute deep vein thrombus visualized in the left lower extremity.     Imaging & Doppler Findings:     Right        Flow  CFV   Spontaneous/Phasic       Left                  Compress Thrombus        Flow  Distal External Iliac                   Spontaneous/Phasic  CFV                     Yes      None   Spontaneous/Phasic  PFV                     Yes      None  FV Proximal             Yes      None   Spontaneous/Phasic  FV Mid                  Yes      None  FV Distal               Yes      None  Popliteal               Yes      None   Spontaneous/Phasic  Peroneal                Yes      None  PTV                     Yes      None       [id number] Mejia Gutierrez MD  Electronically signed by [id number] Mejia Gutierrez MD on 9/11/2024 at 5:14:59 PM       ** Final **  CT lower extremity left w IV contrast  Narrative: Interpreted By:  Yenni Zhang,   STUDY:  CT LOWER EXTREMITY LEFT W IV CONTRAST; ;  9/11/2024 12:10 pm      INDICATION:  Signs/Symptoms:Infection of left lower leg, from the knee down.          COMPARISON:  None.      ACCESSION NUMBER(S):  [id number]      ORDERING CLINICIAN:  MARAH ENGLISH      TECHNIQUE:  Serial axial CT images obtained of the left leg following intravenous  administration of 73 mL of Omnipaque 350. Images reformatted in the  coronal and sagittal projection      FINDINGS:  Subcutaneous tissues of the left calf demonstrate curvilinear area of  fluid density within the pretibial location of the proximal left calf  with subtle peripheral rim enhancement without foci of gas  demonstrated. This area of fluid signal extends to the anteromedial  margin of the proximal calf with collection measuring 1.0 x 5.2 x 6.9  cm in the AP, transverse and craniocaudal dimension respectively.  This extends from the level of the tibial tuberosity distally along  the pretibial location. Findings are in keeping with abscess  formation in the appropriate clinical situation.  Surrounding  subcutaneous edema demonstrated. Subtle curvilinear fluid and edema  about the medial calf proximally.      Generalized subcutaneous edema within the proximal calf. There is  component of skin thickening and subcutaneous edema with cellulitis  in the appropriate clinical situation. The edema tracks along  superficial fascial plane of the proximal to mid calf. There is no  edema in the inter fascial planes within the muscle compartments of  the left calf. No focal fluid density or evidence for infectious  myositis.      Tibia and fibula demonstrate no cortical or trabecular abnormality.  Extensor mechanism is intact. Achilles tendon is unremarkable. No  ankle joint effusion. Visualized popliteal vasculature as well as  extending into the calf distally unremarkable.                      Impression: 1. Pretibial subcutaneous fluid collection from the level of the  tibial tuberosity extending distally measuring 6.9 cm in the  craniocaudal dimension with abscess formation of concern. No foci of  gas within the collection of the surrounding subcutaneous edema  demonstrated. This suggests underlying cellulitis. Correlate with  focal ulceration and fluid leaking from the collection.      2. Cellulitis within the calf with edema tracking along superficial  fascial plane of the proximal to mid calf.      3. No infectious myositis.          MACRO:  None      Signed by: Yenni Zhang 9/11/2024 1:55 PM  Dictation workstation:   [id number]      Physical Exam  Constitutional:       Appearance: Normal appearance.   HENT:      Head: Normocephalic and atraumatic.      Right Ear: Tympanic membrane normal.      Nose: Nose normal.      Mouth/Throat:      Mouth: Mucous membranes are moist.   Eyes:      Pupils: Pupils are equal, round, and reactive to light.   Cardiovascular:      Rate and Rhythm: Normal rate and regular rhythm.   Pulmonary:      Effort: Pulmonary effort is normal.      Breath sounds: Normal breath  sounds.   Abdominal:      Palpations: Abdomen is soft.   Musculoskeletal:         General: Swelling and tenderness present.      Cervical back: Normal range of motion and neck supple.      Comments: Left leg    Neurological:      General: No focal deficit present.      Mental Status: She is alert.   Psychiatric:         Mood and Affect: Mood normal.         Relevant Results           Scheduled medications  enoxaparin, 40 mg, subcutaneous, Daily  polyethylene glycol, 17 g, oral, Daily  vancomycin, 750 mg, intravenous, q12h      Continuous medications     PRN medications  PRN medications: acetaminophen **OR** acetaminophen **OR** acetaminophen, acetaminophen **OR** acetaminophen **OR** acetaminophen, ketorolac, morphine, morphine, ondansetron ODT **OR** ondansetron, vancomycin      Assessment/Plan        64-year-old female with no significant past medical history presents with left lower extremity cellulitis s/p I&D    Continue ongoing care  There was some soakage in her dressing.  Ortho on board for daily dressing  Patient will have wound VAC per orthopedic  Patient tissue culture growing MRSA.  Will keep vancomycin and discontinue Zosyn  Based on sensitivities we can de-escalate to oral antibiotics at discharge.  Toradol ordered for pain  Avoid narcotics plan for discharge on Monday after orthopedic review    DVT prophylaxis: Lovenox  GI prophylaxis not required  Full code                      Jay Enriquez MD

## 2024-09-15 NOTE — CARE PLAN
The patient's goals for the shift include no falls    The clinical goals for the shift include pain control

## 2024-09-15 NOTE — CARE PLAN
The patient's goals for the shift include no falls    The clinical goals for the shift include pain control      Problem: Skin  Goal: Decreased wound size/increased tissue granulation at next dressing change  9/15/2024 1214 by Darlene Conley RN  Outcome: Progressing  9/15/2024 1202 by Darlene Conley RN  Outcome: Progressing  Goal: Participates in plan/prevention/treatment measures  9/15/2024 1214 by Darlene Conley RN  Outcome: Progressing  9/15/2024 1202 by Darlene Conley RN  Outcome: Progressing  Goal: Promote/optimize nutrition  9/15/2024 1214 by Darlene Conley RN  Outcome: Progressing  9/15/2024 1202 by Darlene Conley RN  Outcome: Progressing  Goal: Promote skin healing  9/15/2024 1214 by Darlene Conley RN  Outcome: Progressing  9/15/2024 1202 by Darlene Conley RN  Outcome: Progressing     Problem: Pain - Adult  Goal: Verbalizes/displays adequate comfort level or baseline comfort level  9/15/2024 1214 by Darlene Conley RN  Outcome: Progressing  9/15/2024 1202 by Darlene Conley RN  Outcome: Progressing     Problem: Safety - Adult  Goal: Free from fall injury  9/15/2024 1214 by Darlene Conley RN  Outcome: Progressing  9/15/2024 1202 by Darlene Conley RN  Outcome: Progressing     Problem: Discharge Planning  Goal: Discharge to home or other facility with appropriate resources  9/15/2024 1214 by Darlene Conley RN  Outcome: Progressing  9/15/2024 1202 by Darlene Conley RN  Outcome: Progressing     Problem: Chronic Conditions and Co-morbidities  Goal: Patient's chronic conditions and co-morbidity symptoms are monitored and maintained or improved  9/15/2024 1214 by Darlene Conley RN  Outcome: Progressing  9/15/2024 1202 by Darlene Conley RN  Outcome: Progressing     Problem: Pain  Goal: Takes deep breaths with improved pain control throughout the shift  9/15/2024 1214 by Darlene Conley RN  Outcome:  Progressing  9/15/2024 1202 by Darlene Conley RN  Outcome: Progressing  Goal: Turns in bed with improved pain control throughout the shift  9/15/2024 1214 by Darlene Conley RN  Outcome: Progressing  9/15/2024 1202 by Darlene Conley RN  Outcome: Progressing  Goal: Walks with improved pain control throughout the shift  9/15/2024 1214 by Darlene Conley, TRACEY  Outcome: Progressing  9/15/2024 1202 by Darlene Conley RN  Outcome: Progressing  Goal: Performs ADL's with improved pain control throughout shift  9/15/2024 1214 by Darlene Conley RN  Outcome: Progressing  9/15/2024 1202 by Darlene Conley RN  Outcome: Progressing  Goal: Participates in PT with improved pain control throughout the shift  9/15/2024 1214 by Darlene Conley RN  Outcome: Progressing  9/15/2024 1202 by Darlene Conley RN  Outcome: Progressing  Goal: Free from opioid side effects throughout the shift  9/15/2024 1214 by Darlene Conley RN  Outcome: Progressing  9/15/2024 1202 by Darelne Conley RN  Outcome: Progressing  Goal: Free from acute confusion related to pain meds throughout the shift  9/15/2024 1214 by Darlene Conley RN  Outcome: Progressing  9/15/2024 1202 by Darlene Conley RN  Outcome: Progressing

## 2024-09-16 LAB
ANION GAP SERPL CALC-SCNC: 13 MMOL/L (ref 10–20)
BUN SERPL-MCNC: 15 MG/DL (ref 6–23)
CALCIUM SERPL-MCNC: 8.8 MG/DL (ref 8.6–10.3)
CHLORIDE SERPL-SCNC: 105 MMOL/L (ref 98–107)
CO2 SERPL-SCNC: 24 MMOL/L (ref 21–32)
CREAT SERPL-MCNC: 0.68 MG/DL (ref 0.5–1.05)
EGFRCR SERPLBLD CKD-EPI 2021: >90 ML/MIN/1.73M*2
ERYTHROCYTE [DISTWIDTH] IN BLOOD BY AUTOMATED COUNT: 13.1 % (ref 11.5–14.5)
GLUCOSE SERPL-MCNC: 117 MG/DL (ref 74–99)
HCT VFR BLD AUTO: 34.8 % (ref 36–46)
HGB BLD-MCNC: 11.1 G/DL (ref 12–16)
MCH RBC QN AUTO: 29 PG (ref 26–34)
MCHC RBC AUTO-ENTMCNC: 31.9 G/DL (ref 32–36)
MCV RBC AUTO: 91 FL (ref 80–100)
NRBC BLD-RTO: 0 /100 WBCS (ref 0–0)
PLATELET # BLD AUTO: 297 X10*3/UL (ref 150–450)
POTASSIUM SERPL-SCNC: 3.9 MMOL/L (ref 3.5–5.3)
RBC # BLD AUTO: 3.83 X10*6/UL (ref 4–5.2)
SODIUM SERPL-SCNC: 138 MMOL/L (ref 136–145)
WBC # BLD AUTO: 11.5 X10*3/UL (ref 4.4–11.3)

## 2024-09-16 PROCEDURE — 97605 NEG PRS WND THER DME<=50SQCM: CPT | Performed by: SPECIALIST

## 2024-09-16 PROCEDURE — 85027 COMPLETE CBC AUTOMATED: CPT | Performed by: STUDENT IN AN ORGANIZED HEALTH CARE EDUCATION/TRAINING PROGRAM

## 2024-09-16 PROCEDURE — 1100000001 HC PRIVATE ROOM DAILY

## 2024-09-16 PROCEDURE — 2500000004 HC RX 250 GENERAL PHARMACY W/ HCPCS (ALT 636 FOR OP/ED): Performed by: HOSPITALIST

## 2024-09-16 PROCEDURE — 80048 BASIC METABOLIC PNL TOTAL CA: CPT | Performed by: HOSPITALIST

## 2024-09-16 PROCEDURE — 99232 SBSQ HOSP IP/OBS MODERATE 35: CPT | Performed by: STUDENT IN AN ORGANIZED HEALTH CARE EDUCATION/TRAINING PROGRAM

## 2024-09-16 PROCEDURE — 36415 COLL VENOUS BLD VENIPUNCTURE: CPT | Performed by: STUDENT IN AN ORGANIZED HEALTH CARE EDUCATION/TRAINING PROGRAM

## 2024-09-16 ASSESSMENT — PAIN SCALES - GENERAL
PAINLEVEL_OUTOF10: 0 - NO PAIN
PAINLEVEL_OUTOF10: 0 - NO PAIN

## 2024-09-16 ASSESSMENT — PAIN - FUNCTIONAL ASSESSMENT: PAIN_FUNCTIONAL_ASSESSMENT: 0-10

## 2024-09-16 NOTE — PROGRESS NOTES
Cecilia Pinto is a 64 y.o. female on day 5 of admission presenting with Cellulitis and abscess of left lower extremity.      Subjective     Patient seen and examined at bedside.  Pain is better tingling and numbness is better Objective     Last Recorded Vitals  /84 (BP Location: Left arm, Patient Position: Sitting)   Pulse 76   Temp 35.6 °C (96.1 °F) (Temporal)   Resp 20   Wt 74.9 kg (165 lb 2 oz)   SpO2 97%   Intake/Output last 3 Shifts:    Intake/Output Summary (Last 24 hours) at 9/16/2024 1744  Last data filed at 9/16/2024 1438  Gross per 24 hour   Intake 1535 ml   Output --   Net 1535 ml       Admission Weight  Weight: 77.1 kg (170 lb) (09/11/24 0948)    Daily Weight  09/11/24 : 74.9 kg (165 lb 2 oz)    Image Results  Lower extremity venous duplex [place] [address]  Phone [phone] ext-[phone], Fax [phone]       Vascular Lab Report     [facility]OWER EXTREM[facility]EN[facility]UP[facility] LEFT    Patient Name:      CECILIA PINTO  Reading Physician:  [id number] Mejia Gutierrez MD  Study Date:        9/11/2024           Ordering Provider:  [id number] MARAH ENGLISH  MRN/PID:           [id number]            Fellow:  Accession#:        [id number]        Technologist:       Antoine Huff RVT  Date of Birth/Age: 1960 / 64 years Technologist 2:  Gender:            F                   Encounter#:         [id number]  Admission Status:  Emergency           Location Performed: [facility]       Diagnosis/ICD: Pain in left lower leg-M79.662  CPT Codes:     92679 Peripheral venous duplex scan for DVT Limited       Pertinent History: Leg pain.       CONCLUSIONS:  Right Lower Venous: The right common femoral vein demonstrates normal spontaneous and respirophasic flow.  Left Lower Venous: No evidence of acute deep  vein thrombus visualized in the left lower extremity.     Imaging & Doppler Findings:     Right        Flow  CFV   Spontaneous/Phasic       Left                  Compress Thrombus        Flow  Distal External Iliac                   Spontaneous/Phasic  CFV                     Yes      None   Spontaneous/Phasic  PFV                     Yes      None  FV Proximal             Yes      None   Spontaneous/Phasic  FV Mid                  Yes      None  FV Distal               Yes      None  Popliteal               Yes      None   Spontaneous/Phasic  Peroneal                Yes      None  PTV                     Yes      None       [id number] Mejia Gutierrez MD  Electronically signed by [id number] Mejia Gutierrez MD on 9/11/2024 at 5:14:59 PM       ** Final **  CT lower extremity left w IV contrast  Narrative: Interpreted By:  Yenni Zhang,   STUDY:  CT LOWER EXTREMITY LEFT W IV CONTRAST; ;  9/11/2024 12:10 pm      INDICATION:  Signs/Symptoms:Infection of left lower leg, from the knee down.          COMPARISON:  None.      ACCESSION NUMBER(S):  [id number]      ORDERING CLINICIAN:  MARAH ENGLISH      TECHNIQUE:  Serial axial CT images obtained of the left leg following intravenous  administration of 73 mL of Omnipaque 350. Images reformatted in the  coronal and sagittal projection      FINDINGS:  Subcutaneous tissues of the left calf demonstrate curvilinear area of  fluid density within the pretibial location of the proximal left calf  with subtle peripheral rim enhancement without foci of gas  demonstrated. This area of fluid signal extends to the anteromedial  margin of the proximal calf with collection measuring 1.0 x 5.2 x 6.9  cm in the AP, transverse and craniocaudal dimension respectively.  This extends from the level of the tibial tuberosity distally along  the pretibial location. Findings are in keeping with abscess  formation in the appropriate clinical situation. Surrounding  subcutaneous edema  demonstrated. Subtle curvilinear fluid and edema  about the medial calf proximally.      Generalized subcutaneous edema within the proximal calf. There is  component of skin thickening and subcutaneous edema with cellulitis  in the appropriate clinical situation. The edema tracks along  superficial fascial plane of the proximal to mid calf. There is no  edema in the inter fascial planes within the muscle compartments of  the left calf. No focal fluid density or evidence for infectious  myositis.      Tibia and fibula demonstrate no cortical or trabecular abnormality.  Extensor mechanism is intact. Achilles tendon is unremarkable. No  ankle joint effusion. Visualized popliteal vasculature as well as  extending into the calf distally unremarkable.                      Impression: 1. Pretibial subcutaneous fluid collection from the level of the  tibial tuberosity extending distally measuring 6.9 cm in the  craniocaudal dimension with abscess formation of concern. No foci of  gas within the collection of the surrounding subcutaneous edema  demonstrated. This suggests underlying cellulitis. Correlate with  focal ulceration and fluid leaking from the collection.      2. Cellulitis within the calf with edema tracking along superficial  fascial plane of the proximal to mid calf.      3. No infectious myositis.          MACRO:  None      Signed by: Yenni Zhang 9/11/2024 1:55 PM  Dictation workstation:   [id number]      Physical Exam  Constitutional:       Appearance: Normal appearance.   HENT:      Head: Normocephalic and atraumatic.      Right Ear: Tympanic membrane normal.      Nose: Nose normal.      Mouth/Throat:      Mouth: Mucous membranes are moist.   Eyes:      Pupils: Pupils are equal, round, and reactive to light.   Cardiovascular:      Rate and Rhythm: Normal rate and regular rhythm.   Pulmonary:      Effort: Pulmonary effort is normal.      Breath sounds: Normal breath sounds.   Abdominal:      Palpations:  Abdomen is soft.   Musculoskeletal:         General: Swelling and tenderness present.      Cervical back: Normal range of motion and neck supple.      Comments: Left leg    Neurological:      General: No focal deficit present.      Mental Status: She is alert.   Psychiatric:         Mood and Affect: Mood normal.         Relevant Results           Scheduled medications  enoxaparin, 40 mg, subcutaneous, Daily  polyethylene glycol, 17 g, oral, Daily  vancomycin, 750 mg, intravenous, q12h      Continuous medications     PRN medications  PRN medications: acetaminophen **OR** acetaminophen **OR** acetaminophen, acetaminophen **OR** acetaminophen **OR** acetaminophen, ketorolac, morphine, morphine, ondansetron ODT **OR** ondansetron, vancomycin      Assessment/Plan        64-year-old female with no significant past medical history presents with left lower extremity cellulitis s/p I&D    Continue ongoing care  Dressing removed today and wound VAC applied  Patient tissue culture growing MRSA.  Will keep vancomycin and discontinue Zosyn.   Based on sensitivities we can de-escalate to oral antibiotics at discharge.  Toradol ordered for pain  Plan to DC tomorrow  Home health care and follow-up to be planned    DVT prophylaxis: Lovenox  GI prophylaxis not required  Full code                      Jay Enriquez MD

## 2024-09-16 NOTE — PROGRESS NOTES
Care Transitions: Patient reviewed in care round meeting this AM. ADOD 24 hours. Dr. Cedeño discussed with hospital provider about placing a wound vac on Left LE wound. Met with patient at bedside. States she saw one of Dr. Cedeño's assistant providers this AM but can't remember their name. Patient states she was told a vac was ordered and  Would place it today sometime. Will reach out to Dr. Cedeño and his NP via secure chat for discharge disposition planning. If patient is going home with a wound vac she may need a home vac and home care ordered. Patient feels her son and daughter are teachable for vac changes if needed. Care team to follow for plan. Julia Keller RN/TCC    -7556 Met with patient, Dr. Cedeño, and bedside nurses in room. Assisted Dr. Cedeño with wound vac application to LLE surgical wound. Photos / measurements obtained per bedside nurses. Wound vac setting at 125 mmHg with good seal. Patient tolerated well. Dr. Cedeño signed wound vac order for [facility] wound vac to go home with patient. Dr. Cedeño also agreeable to follow vac and home care orders. Discussed with patient and encouraged finding a PCP. List provided on 9/12/24 of available providers in her area. Patient confirmed she is agreeable to home with [facility] for wound vac management and education. Julia Keller RN/TCC    -1069 Wound Vac order/insurance verification form filled out and faxed to [employer] @ [phone]. Attached patient facesheet, H&P, Op note, progress notes. Requested vac be delivered to patient room at [facility]. Care team to follow. Julia Keller RN/TCC

## 2024-09-16 NOTE — CONSULTS
Vancomycin Dosing by Pharmacy- FOLLOW UP    Belgica Pinto is a 64 y.o. year old female who Pharmacy has been consulted for vancomycin dosing for cellulitis, skin and soft tissue. Based on the patient's indication and renal status this patient is being dosed based on a goal AUC of 400-600.     Renal function is currently stable.    Current vancomycin dose: 750 mg given every 12 hours    Estimated vancomycin AUC on current dose: 422 mg/L.hr     Visit Vitals  /78 (BP Location: Right arm, Patient Position: Sitting)   Pulse 70   Temp 35.8 °C (96.4 °F) (Temporal)   Resp 20        Lab Results   Component Value Date    CREATININE 0.68 2024    CREATININE 0.88 2024    CREATININE 0.74 2024    CREATININE 0.70 2024        Patient weight is as follows:   Vitals:    24 1709   Weight: 74.9 kg (165 lb 2 oz)       Cultures:  Susceptibility data for the encounter in last 14 days.  Collected Specimen Info Organism Clindamycin Erythromycin Oxacillin Tetracycline Trimethoprim/Sulfamethoxazole Vancomycin   24 Swab from [facility]SCESS Staphylococcus aureus         24 Swab from [facility]SCESS Methicillin Resistant Staphylococcus aureus (MRSA)  S  R  R  S  S  S        I/O last 3 completed shifts:  In: 360 (4.8 mL/kg) [P.O.:360]  Out: - (0 mL/kg)   Weight: 74.9 kg   I/O during current shift:  No intake/output data recorded.    Temp (24hrs), Av.2 °C (97.2 °F), Min:35.8 °C (96.4 °F), Max:36.6 °C (97.9 °F)      Assessment/Plan    Within goal AUC range. Continue current vancomycin regimen.    This dosing regimen is predicted by [employer] to result in the following pharmacokinetic parameters:  Regimen: 750 mg IV every 12 hours.  Start time: 13:53 on 2024  Exposure target: AUC24 (range)400-600 mg/L.hr   SIV47-60: 431 mg/L.hr  AUC24,ss: 422 mg/L.hr  Probability of AUC24 > 400: 65 %  Ctrough,ss: 11.9 mg/L  Probability of Ctrough,ss > 20: 0 %      The next level will be obtained on 2024 at 0500.  May be obtained sooner if clinically indicated.   Will continue to monitor renal function daily while on vancomycin and order serum creatinine at least every 48 hours if not already ordered.  Follow for continued vancomycin needs, clinical response, and signs/symptoms of toxicity.       Robert Sheets RP

## 2024-09-16 NOTE — PROGRESS NOTES
"Belgica Pinto is a 64 y.o. female on day 5 of admission presenting with Cellulitis and abscess of left lower extremity.    Subjective   She is doing well.  Her pain level is well-controlled.  The erythema around the calf is much reduced.       Objective     Physical Exam  The wound was reevaluated and the dressing was changed.  The incision was about 6 cm and was about a centimeter  of diastases.  This wound goes very deep on the medial aspect with the abscess cavity at least 7 to 8 cm posterior.  A wound VAC was applied once the packing had been removed.  There was minimal purulence when the packing was removed.  She is neurovascularly intact distally.  Her calf was sore but noticed signs of Homans.  She is neurovascularly intact distally.  Last Recorded Vitals  Blood pressure 134/84, pulse 76, temperature 35.6 °C (96.1 °F), temperature source Temporal, resp. rate 20, height 1.549 m (5' 1\"), weight 74.9 kg (165 lb 2 oz), SpO2 97%.  Intake/Output last 3 Shifts:  I/O last 3 completed shifts:  In: 360 (4.8 mL/kg) [P.O.:360]  Out: - (0 mL/kg)   Weight: 74.9 kg     Relevant Results      Scheduled medications  enoxaparin, 40 mg, subcutaneous, Daily  polyethylene glycol, 17 g, oral, Daily  vancomycin, 750 mg, intravenous, q12h      Continuous medications     PRN medications  PRN medications: acetaminophen **OR** acetaminophen **OR** acetaminophen, acetaminophen **OR** acetaminophen **OR** acetaminophen, ketorolac, morphine, morphine, ondansetron ODT **OR** ondansetron, vancomycin  Results for orders placed or performed during the hospital encounter of 09/11/24 (from the past 24 hour(s))   CBC   Result Value Ref Range    WBC 11.5 (H) 4.4 - 11.3 x10*3/uL    nRBC 0.0 0.0 - 0.0 /100 WBCs    RBC 3.83 (L) 4.00 - 5.20 x10*6/uL    Hemoglobin 11.1 (L) 12.0 - 16.0 g/dL    Hematocrit 34.8 (L) 36.0 - 46.0 %    MCV 91 80 - 100 fL    MCH 29.0 26.0 - 34.0 pg    MCHC 31.9 (L) 32.0 - 36.0 g/dL    RDW 13.1 11.5 - 14.5 %    Platelets " 297 150 - 450 x10*3/uL   Basic metabolic panel   Result Value Ref Range    Glucose 117 (H) 74 - 99 mg/dL    Sodium 138 136 - 145 mmol/L    Potassium 3.9 3.5 - 5.3 mmol/L    Chloride 105 98 - 107 mmol/L    Bicarbonate 24 21 - 32 mmol/L    Anion Gap 13 10 - 20 mmol/L    Urea Nitrogen 15 6 - 23 mg/dL    Creatinine 0.68 0.50 - 1.05 mg/dL    eGFR >90 >60 mL/min/1.73m*2    Calcium 8.8 8.6 - 10.3 mg/dL                            Assessment/Plan   Assessment & Plan  Cellulitis and abscess of left lower extremity    Abscess of left leg    Her packing was removed today and a wound VAC was applied.       I spent 60 minutes in the professional and overall care of this patient.      Ramone Cedeño MD

## 2024-09-17 ENCOUNTER — HOME HEALTH ADMISSION (OUTPATIENT)
Dept: HOME HEALTH SERVICES | Facility: HOME HEALTH | Age: 64
End: 2024-09-17
Payer: MEDICARE

## 2024-09-17 ENCOUNTER — PHARMACY VISIT (OUTPATIENT)
Dept: PHARMACY | Facility: CLINIC | Age: 64
End: 2024-09-17

## 2024-09-17 VITALS
TEMPERATURE: 96.8 F | DIASTOLIC BLOOD PRESSURE: 77 MMHG | WEIGHT: 165.12 LBS | HEART RATE: 69 BPM | RESPIRATION RATE: 18 BRPM | BODY MASS INDEX: 31.18 KG/M2 | OXYGEN SATURATION: 95 % | HEIGHT: 61 IN | SYSTOLIC BLOOD PRESSURE: 127 MMHG

## 2024-09-17 LAB
CREAT SERPL-MCNC: 0.71 MG/DL (ref 0.5–1.05)
EGFRCR SERPLBLD CKD-EPI 2021: >90 ML/MIN/1.73M*2
ERYTHROCYTE [DISTWIDTH] IN BLOOD BY AUTOMATED COUNT: 13 % (ref 11.5–14.5)
FUNGUS SPEC CULT: NORMAL
FUNGUS SPEC CULT: NORMAL
FUNGUS SPEC FUNGUS STN: NORMAL
FUNGUS SPEC FUNGUS STN: NORMAL
HCT VFR BLD AUTO: 35.1 % (ref 36–46)
HGB BLD-MCNC: 11.3 G/DL (ref 12–16)
MCH RBC QN AUTO: 29.3 PG (ref 26–34)
MCHC RBC AUTO-ENTMCNC: 32.2 G/DL (ref 32–36)
MCV RBC AUTO: 91 FL (ref 80–100)
NRBC BLD-RTO: 0 /100 WBCS (ref 0–0)
PLATELET # BLD AUTO: 325 X10*3/UL (ref 150–450)
RBC # BLD AUTO: 3.86 X10*6/UL (ref 4–5.2)
VANCOMYCIN SERPL-MCNC: 16.4 UG/ML (ref 5–20)
WBC # BLD AUTO: 10.8 X10*3/UL (ref 4.4–11.3)

## 2024-09-17 PROCEDURE — 36415 COLL VENOUS BLD VENIPUNCTURE: CPT | Performed by: STUDENT IN AN ORGANIZED HEALTH CARE EDUCATION/TRAINING PROGRAM

## 2024-09-17 PROCEDURE — RXMED WILLOW AMBULATORY MEDICATION CHARGE

## 2024-09-17 PROCEDURE — 82565 ASSAY OF CREATININE: CPT | Performed by: PHARMACIST

## 2024-09-17 PROCEDURE — 80202 ASSAY OF VANCOMYCIN: CPT | Performed by: PHARMACIST

## 2024-09-17 PROCEDURE — 85027 COMPLETE CBC AUTOMATED: CPT | Performed by: STUDENT IN AN ORGANIZED HEALTH CARE EDUCATION/TRAINING PROGRAM

## 2024-09-17 PROCEDURE — 99238 HOSP IP/OBS DSCHRG MGMT 30/<: CPT | Performed by: STUDENT IN AN ORGANIZED HEALTH CARE EDUCATION/TRAINING PROGRAM

## 2024-09-17 PROCEDURE — 2500000004 HC RX 250 GENERAL PHARMACY W/ HCPCS (ALT 636 FOR OP/ED): Performed by: HOSPITALIST

## 2024-09-17 PROCEDURE — 2500000002 HC RX 250 W HCPCS SELF ADMINISTERED DRUGS (ALT 637 FOR MEDICARE OP, ALT 636 FOR OP/ED): Performed by: STUDENT IN AN ORGANIZED HEALTH CARE EDUCATION/TRAINING PROGRAM

## 2024-09-17 RX ORDER — SULFAMETHOXAZOLE AND TRIMETHOPRIM 800; 160 MG/1; MG/1
2 TABLET ORAL 2 TIMES DAILY
Qty: 20 TABLET | Refills: 0 | Status: SHIPPED | OUTPATIENT
Start: 2024-09-17 | End: 2024-09-22

## 2024-09-17 RX ORDER — SULFAMETHOXAZOLE AND TRIMETHOPRIM 800; 160 MG/1; MG/1
2 TABLET ORAL 2 TIMES DAILY
Status: DISCONTINUED | OUTPATIENT
Start: 2024-09-17 | End: 2024-09-17 | Stop reason: HOSPADM

## 2024-09-17 ASSESSMENT — COGNITIVE AND FUNCTIONAL STATUS - GENERAL
MOBILITY SCORE: 23
DAILY ACTIVITIY SCORE: 24
MOBILITY SCORE: 24
DAILY ACTIVITIY SCORE: 24
CLIMB 3 TO 5 STEPS WITH RAILING: A LITTLE

## 2024-09-17 ASSESSMENT — PAIN DESCRIPTION - LOCATION: LOCATION: LEG

## 2024-09-17 ASSESSMENT — PAIN SCALES - GENERAL: PAINLEVEL_OUTOF10: 4

## 2024-09-17 NOTE — PROGRESS NOTES
"Belgica Pinto is a 64 y.o. female on day 6 of admission presenting with Cellulitis and abscess of left lower extremity.    Subjective     Belgica is a pleasant 64-year-old female admitted for cellulitis and abscess of the left lower extremity with surgical I&D per Dr. Cedeño on 9/12/2024.  Patient is ANO x 3, sitting in chair conversing.  Wound VAC to left lower extremity running.  Patient states pain symptoms controlled well with Tylenol.  Inquiring about discharge home.     Review of Systems   Constitutional: Negative.    HENT: Negative.     Respiratory: Negative.     Cardiovascular: Negative.    Endocrine: Negative.    Musculoskeletal:  Positive for arthralgias.   Skin: Negative.    Neurological: Negative.    Hematological: Negative.    Psychiatric/Behavioral: Negative.     Objective     Physical Exam    Left lower leg     Mid anterior lower leg with wound VAC dressing applied with good seal, pressure at 125 mmHg.  There is scant amount of serous sang drainage in the tubing.  Mild tenderness with light palpation of the dressing.  Surrounding leg color with mild swelling which is improved since admission, skin is darker pink in color, there is no redness, streaking or openings.  Full ROM of distal joints with no sx aggravation  Distal motor and sensory intact, cap refill at 2 seconds.  Calf is supple and non tender.    Last Recorded Vitals  Blood pressure 130/76, pulse 69, temperature 36.4 °C (97.6 °F), temperature source Oral, resp. rate 18, height 1.549 m (5' 1\"), weight 74.9 kg (165 lb 2 oz), SpO2 95%.  Intake/Output last 3 Shifts:  I/O last 3 completed shifts:  In: 1535 (20.5 mL/kg) [P.O.:1535]  Out: - (0 mL/kg)   Weight: 74.9 kg     Relevant Results      Scheduled medications  enoxaparin, 40 mg, subcutaneous, Daily  polyethylene glycol, 17 g, oral, Daily  vancomycin, 750 mg, intravenous, q12h      Continuous medications     PRN medications  PRN medications: acetaminophen **OR** acetaminophen **OR** " acetaminophen, acetaminophen **OR** acetaminophen **OR** acetaminophen, ketorolac, morphine, morphine, ondansetron ODT **OR** ondansetron, vancomycin  Results for orders placed or performed during the hospital encounter of 09/11/24 (from the past 24 hour(s))   Vancomycin   Result Value Ref Range    Vancomycin 16.4 5.0 - 20.0 ug/mL   CBC   Result Value Ref Range    WBC 10.8 4.4 - 11.3 x10*3/uL    nRBC 0.0 0.0 - 0.0 /100 WBCs    RBC 3.86 (L) 4.00 - 5.20 x10*6/uL    Hemoglobin 11.3 (L) 12.0 - 16.0 g/dL    Hematocrit 35.1 (L) 36.0 - 46.0 %    MCV 91 80 - 100 fL    MCH 29.3 26.0 - 34.0 pg    MCHC 32.2 32.0 - 36.0 g/dL    RDW 13.0 11.5 - 14.5 %    Platelets 325 150 - 450 x10*3/uL   Creatinine   Result Value Ref Range    Creatinine 0.71 0.50 - 1.05 mg/dL    eGFR >90 >60 mL/min/1.73m*2                            Assessment/Plan   Assessment & Plan  Cellulitis and abscess of left lower extremity    Abscess of left leg    Continue antibiotics as ordered  Activity as tolerated  Plan for DC home with home care for wound VAC dressing changes 2-3 times per week, continue pressure setting of 125 mmHg  Await arrival of home wound VAC machine and supplies  Follow-up in Ortho office 1 week after discharge, sooner for changes or concerns       I spent 30 minutes in the professional and overall care of this patient.      Karol Sandoval, APRN-CNP

## 2024-09-17 NOTE — PROGRESS NOTES
Care Transitions: Patient reviewed in care round meeting this AM; awaiting portable wound vac approval/delivery for patient to wear home. Discharge plan remains home with wound vac and [facility] services. Spoke to Dakota [facility] representative to check on vac order status. Home care agency information and phone number provided per [facility] request. Rep to look into the order status and return call with a delivery confirmation date/time. Julia Keller RN/TCC    - [phone] Called [facility] Rep Dakota @ [phone] ; he is filling in for our regular Rep Zelda Owen this week. Checked on status of order. States vac insurance auth is still pending approval. Dakota to call back once approved so he can give an ETA of delivery. Requested Vac be delivered to third floor nursing station. Julia Keller RN/TCC    - [phone] Met with patient and daughter Darlene at bedside. Updated on vac order status still pending insurance approval. Daughter questioned wound vac dressing changes and how they are done. All questions answered and explained they will also be referred to  home care for a nurse to teach family. Medicare IMM reviewed with patient and daughter, patient signed, copy provided, original placed in chart. Voiced understanding. Julia Keller RN/TCC    - [phone] Received a call from Ion @ [employer] [facility]. Notified that wound vac was approved by insurance and released for delivery. Estimated time of delivery is in 3-4 hours this evening.   Notified bedside nursing, hospital provider, Dr. Cedeño, Charge nurse, and discharge nurse that vac is to arrive this evening. Dr. Cedeño and hospital provider Dr. Enriquez are ok with patient discharge once vac is received if patient has a ride home. Bedside nursing notified patient of vac approval and shipment. Vac to be delivered to third floor nursing station. Julia Keller RN/TCC    -[phone] Spoke to patients daughter Darlene @ [phone]. Notified of vac approval and arrival this evening. Patient  is cleared to discharge once vac arrives. Daughter requested bedside nurse calls her when vac arrives. If it is not too late she will pick patient up for discharge tonight. Otherwise she won't be able to transport her home until tomorrow around 1600. Bedside nurse instructed to call daughter when vac arrives and pass along in shift change report. Julia Keller RN/TCC    - [phone] [facility] referral order placed per hospital provider. Secure chat sent to Maggi Cortes RN and Lyssa De La Vega RN, intake for [facility] to verify referral is received and notify that Dr. Cedeño will be the provider signing and following home care order. Care team to follow up any needs or questions in AM. Julia Keller RN/TCC

## 2024-09-17 NOTE — PROGRESS NOTES
Cecilia Pinto is a 64 y.o. female on day 6 of admission presenting with Cellulitis and abscess of left lower extremity.      Subjective     Patient seen and examined at bedside.  She feels better.  Wound VAC applied today Objective     Last Recorded Vitals  /76 (BP Location: Left arm, Patient Position: Sitting)   Pulse 69   Temp 36.4 °C (97.6 °F) (Oral)   Resp 18   Wt 74.9 kg (165 lb 2 oz)   SpO2 95%   Intake/Output last 3 Shifts:    Intake/Output Summary (Last 24 hours) at 9/17/2024 1503  Last data filed at 9/17/2024 1415  Gross per 24 hour   Intake 1397 ml   Output --   Net 1397 ml       Admission Weight  Weight: 77.1 kg (170 lb) (09/11/24 0948)    Daily Weight  09/11/24 : 74.9 kg (165 lb 2 oz)    Image Results  Lower extremity venous duplex [place] [address]  Phone [phone], Fax [phone]       Vascular Lab Report     VASC US LOWER EXTREMITY VENOUS DUPLEX LEFT    Patient Name:      CECILIA PINTO  Reading Physician:  [id number] Mejia Gutierrez MD  Study Date:        9/11/2024           Ordering Provider:  [id number] MARAH ENGLISH  MRN/PID:           [id number]            Fellow:  Accession#:        [id number]        Technologist:       Antoine Huff RVT  Date of Birth/Age: 1960 / 64 years Technologist 2:  Gender:            F                   Encounter#:         [id number]  Admission Status:  Emergency           Location Performed: [facility]       Diagnosis/ICD: Pain in left lower leg-M79.662  CPT Codes:     60165 Peripheral venous duplex scan for DVT Limited       Pertinent History: Leg pain.       CONCLUSIONS:  Right Lower Venous: The right common femoral vein demonstrates normal spontaneous and respirophasic flow.  Left Lower Venous: No evidence of acute deep vein  thrombus visualized in the left lower extremity.     Imaging & Doppler Findings:     Right        Flow  CFV   Spontaneous/Phasic       Left                  Compress Thrombus        Flow  Distal External Iliac                   Spontaneous/Phasic  CFV                     Yes      None   Spontaneous/Phasic  PFV                     Yes      None  FV Proximal             Yes      None   Spontaneous/Phasic  FV Mid                  Yes      None  FV Distal               Yes      None  Popliteal               Yes      None   Spontaneous/Phasic  Peroneal                Yes      None  PTV                     Yes      None       [id number] Mejia Gutierrez MD  Electronically signed by [id number] Mejia Gutierrez MD on 9/11/2024 at 5:14:59 PM       ** Final **  CT lower extremity left w IV contrast  Narrative: Interpreted By:  Yenni Zhang,   STUDY:  CT LOWER EXTREMITY LEFT W IV CONTRAST; ;  9/11/2024 12:10 pm      INDICATION:  Signs/Symptoms:Infection of left lower leg, from the knee down.          COMPARISON:  None.      ACCESSION NUMBER(S):  [id number]      ORDERING CLINICIAN:  MARAH ENGLISH      TECHNIQUE:  Serial axial CT images obtained of the left leg following intravenous  administration of 73 mL of Omnipaque 350. Images reformatted in the  coronal and sagittal projection      FINDINGS:  Subcutaneous tissues of the left calf demonstrate curvilinear area of  fluid density within the pretibial location of the proximal left calf  with subtle peripheral rim enhancement without foci of gas  demonstrated. This area of fluid signal extends to the anteromedial  margin of the proximal calf with collection measuring 1.0 x 5.2 x 6.9  cm in the AP, transverse and craniocaudal dimension respectively.  This extends from the level of the tibial tuberosity distally along  the pretibial location. Findings are in keeping with abscess  formation in the appropriate clinical situation. Surrounding  subcutaneous edema  demonstrated. Subtle curvilinear fluid and edema  about the medial calf proximally.      Generalized subcutaneous edema within the proximal calf. There is  component of skin thickening and subcutaneous edema with cellulitis  in the appropriate clinical situation. The edema tracks along  superficial fascial plane of the proximal to mid calf. There is no  edema in the inter fascial planes within the muscle compartments of  the left calf. No focal fluid density or evidence for infectious  myositis.      Tibia and fibula demonstrate no cortical or trabecular abnormality.  Extensor mechanism is intact. Achilles tendon is unremarkable. No  ankle joint effusion. Visualized popliteal vasculature as well as  extending into the calf distally unremarkable.                      Impression: 1. Pretibial subcutaneous fluid collection from the level of the  tibial tuberosity extending distally measuring 6.9 cm in the  craniocaudal dimension with abscess formation of concern. No foci of  gas within the collection of the surrounding subcutaneous edema  demonstrated. This suggests underlying cellulitis. Correlate with  focal ulceration and fluid leaking from the collection.      2. Cellulitis within the calf with edema tracking along superficial  fascial plane of the proximal to mid calf.      3. No infectious myositis.          MACRO:  None      Signed by: Yenni Zhang 9/11/2024 1:55 PM  Dictation workstation:   [id number]      Physical Exam  Constitutional:       Appearance: Normal appearance.   HENT:      Head: Normocephalic and atraumatic.      Right Ear: Tympanic membrane normal.      Nose: Nose normal.      Mouth/Throat:      Mouth: Mucous membranes are moist.   Eyes:      Pupils: Pupils are equal, round, and reactive to light.   Cardiovascular:      Rate and Rhythm: Normal rate and regular rhythm.   Pulmonary:      Effort: Pulmonary effort is normal.      Breath sounds: Normal breath sounds.   Abdominal:      Palpations:  Abdomen is soft.   Musculoskeletal:         General: Swelling and tenderness present.      Cervical back: Normal range of motion and neck supple.      Comments: Left leg Wound VAC applied   Neurological:      General: No focal deficit present.      Mental Status: She is alert.   Psychiatric:         Mood and Affect: Mood normal.         Relevant Results           Scheduled medications  enoxaparin, 40 mg, subcutaneous, Daily  polyethylene glycol, 17 g, oral, Daily  sulfamethoxazole-trimethoprim, 2 tablet, oral, BID      Continuous medications     PRN medications  PRN medications: acetaminophen **OR** acetaminophen **OR** acetaminophen, acetaminophen **OR** acetaminophen **OR** acetaminophen, ketorolac, morphine, morphine, ondansetron ODT **OR** ondansetron      Assessment/Plan        64-year-old female with no significant past medical history presents with left lower extremity cellulitis s/p I&D    Continue ongoing care  Dressing removed today and wound VAC applied  Patient tissue culture growing MRSA.  S/p IV Vanco and Zosyn.  Will start Bactrim today  Toradol ordered for pain  Pending old VAC delivery at bedside  , Discharge once available  Home with care to be set up    DVT prophylaxis: Lovenox  GI prophylaxis not required  Full code                      Jay Enriquez MD

## 2024-09-17 NOTE — CARE PLAN
The patient's goals for the shift include no falls    The clinical goals for the shift include pain control, decrease infection    Over the shift, the patient had no complaints of pain and required no pain medication.  Dressing to LLE remains dry and intact

## 2024-09-17 NOTE — DISCHARGE SUMMARY
Discharge Diagnosis  Cellulitis and abscess of left lower extremity    Issues Requiring Follow-Up  Orthopedic follow-up  PCP follow-up    Discharge Meds     Medication List      START taking these medications     sulfamethoxazole-trimethoprim 800-160 mg tablet; Commonly known as:   Bactrim DS; Take 2 tablets by mouth 2 times a day for 5 days.     CONTINUE taking these medications     multivitamin tablet; Notes to patient: Per home routine.     STOP taking these medications     clindamycin 300 mg capsule; Commonly known as: Cleocin       Test Results Pending At Discharge  Pending Labs       Order Current Status    Fungal Culture/Smear Preliminary result    Fungal Culture/Smear Preliminary result            Hospital Course     64 y.o. female presenting with pain and swelling left leg with leukocytosis failed outpatient antibiotic therapy and admitted with worsening symptoms.  She took 6 days of antibiotics to complete 7-day course but felt no significant improvement and hence came to hospital for further management .She has left leg cellulitis and subcutaneous abscess. Trauma from tire iron. There is warmth redness pain tenderness erythema and fluctuance.Started on IV antibiotics and orthopedic surgery consulted. CT imaging showing pretibial subcutaneous fluid collection from the level of the tibial tuberosity extending distally meaning 6.9 cm in craniocaudal dimension with abscess formation. No foci of gas within collection of surrounding subcutaneous edema. Underlying cellulitis     During the course in the hospital patient was started on IV antibiotics, IV fluids and orthopedic was consulted.  Patient was taken to surgery for I&D drainage.  Postop patient had pain and was managed with pain medications.  Patient had staged removal of the gauze.  Wound VAC is applied for drainage.  The patient tolerated the procedure well.  Patient be sent home with home health care and wound VAC.  She will be following up with  orthopedic physician.  Wound VAC is monitored by home health care.  Patient vitals remained stable during hospitalization.  She is discharged home in satisfactory condition  Pertinent Physical Exam At Time of Discharge  Physical Exam  Physical Exam  Constitutional:       Appearance: Normal appearance.   HENT:      Head: Normocephalic and atraumatic.      Right Ear: Tympanic membrane normal.      Nose: Nose normal.      Mouth/Throat:      Mouth: Mucous membranes are moist.   Eyes:      Pupils: Pupils are equal, round, and reactive to light.   Cardiovascular:      Rate and Rhythm: Normal rate and regular rhythm.   Pulmonary:      Effort: Pulmonary effort is normal.      Breath sounds: Normal breath sounds.   Abdominal:      Palpations: Abdomen is soft.   Musculoskeletal:         General: Swelling and tenderness present.      Cervical back: Normal range of motion and neck supple.      Comments: Left leg Wound VAC applied   Neurological:      General: No focal deficit present.      Mental Status: She is alert.   Psychiatric:         Mood and Affect: Mood normal.   Outpatient Follow-Up  No future appointments.      Jay Enriquez MD

## 2024-09-17 NOTE — DOCUMENTATION CLARIFICATION NOTE
"    PATIENT:               CECILIA PORRAS  ACCT #:                  [id number]  MRN:                       [id number]  :                       1960  ADMIT DATE:       2024 9:46 AM  DISCH DATE:  RESPONDING PROVIDER #:        [id number]          PROVIDER RESPONSE TEXT:    Incision and Drainage down to and including subcutaneous tissue and fascia    CDI QUERY TEXT:    Clarification    Instruction:    Based on your assessment of the patient and the clinical information, please provide the requested documentation by clicking on the appropriate radio button and enter any additional information if prompted.    Question: Please further clarify the depth of the Incision and Drainage    When answering this query, please exercise your independent professional judgment. The fact that a question is being asked, does not imply that any particular answer is desired or expected.    The patient's clinical indicators include:  Clinical Information: This query refers to the procedure performed on 24 and documented as \"Lower Extremity I  and  D\"  Options provided:  -- Incision and Drainage to skin only  -- Incision and Drainage down to and including subcutaneous tissue and fascia  -- Incision and Drainage down to and including muscle  -- Other - I will add my own diagnosis  -- Refer to Clinical Documentation Reviewer    Query created by: Lali Turner on 2024 9:00 AM      Electronically signed by:  DEANA ACUÑA MD 2024 1:34 PM          "

## 2024-09-18 ENCOUNTER — DOCUMENTATION (OUTPATIENT)
Dept: HOME HEALTH SERVICES | Facility: HOME HEALTH | Age: 64
End: 2024-09-18
Payer: MEDICARE

## 2024-09-18 NOTE — PROGRESS NOTES
"Music Therapy Note    Belgica Pinto was referred by Renata Sanders, RN.     Therapy Session  Referral Type: New referral this admission  Visit Type: New visit  Session Start Time: 1446  Session End Time: 1447  Intervention Delivery: In-person  Conflict of Service: None  Family Present for Session: None     Pre-assessment  Unable to Assess Reason: Outcomes not assessed  Mood/Affect: Calm         Treatment/Interventions  Music Therapy Interventions: Assessment    Post-assessment  Unable to Assess Reason: Did not provide expressive therapy intervention  Mood/Affect: Calm, Appropriate  Total Session Time (min): 1 minutes    Narrative  Assessment Detail: Pt found resting in recliner, awake/alert, upon arrival of music therapist. Pt declined music therapy services, stating \"I don't need anything\" and \"I'm fine with my coloring and TV\". Pt also expressed hopefulness for dc today.  Follow-up: Pt dc'd.    Education Documentation  No documentation found.        Expressive Therapies Note  "

## 2024-09-18 NOTE — HH CARE COORDINATION
Home Care received a Referral for Nursing and Physical Therapy. We have processed the referral for a Start of Care on 09/19/2024.     If you have any questions or concerns, please feel free to contact us at [phone]. Follow the prompts, enter your five digit zip code, and you will be directed to your care team on [facility] 1.

## 2024-09-18 NOTE — PROGRESS NOTES
Pt was discharged last evening with a home wound vac.  Spoke to Lyssa De La Vega with [facility] via Secure Chat and pt is scheduled for a home care visit tomorrow/Thursday.  Call placed to pt at home with an update.  No further needs identified.       WILLEM Mccoy

## 2024-09-18 NOTE — NURSING NOTE
Pt left with daughter via wheelchair. RN turned on portable wound vac to ensure proper function. No problems noted when machine was turned on. Pt left with all her belongings.

## 2024-09-19 ENCOUNTER — HOME CARE VISIT (OUTPATIENT)
Dept: HOME HEALTH SERVICES | Facility: HOME HEALTH | Age: 64
End: 2024-09-19
Payer: MEDICARE

## 2024-09-19 VITALS
OXYGEN SATURATION: 99 % | HEART RATE: 72 BPM | RESPIRATION RATE: 20 BRPM | SYSTOLIC BLOOD PRESSURE: 118 MMHG | TEMPERATURE: 96.5 F | DIASTOLIC BLOOD PRESSURE: 70 MMHG

## 2024-09-19 PROCEDURE — G0299 HHS/HOSPICE OF RN EA 15 MIN: HCPCS | Mod: HHH

## 2024-09-19 PROCEDURE — 169592 NO-PAY CLAIM PROCEDURE

## 2024-09-19 ASSESSMENT — ENCOUNTER SYMPTOMS
PAIN: 1
PERSON REPORTING PAIN: PATIENT

## 2024-09-19 ASSESSMENT — ACTIVITIES OF DAILY LIVING (ADL): ENTERING_EXITING_HOME: MODERATE ASSIST

## 2024-09-20 ENCOUNTER — HOME CARE VISIT (OUTPATIENT)
Dept: HOME HEALTH SERVICES | Facility: HOME HEALTH | Age: 64
End: 2024-09-20
Payer: MEDICARE

## 2024-09-20 VITALS — RESPIRATION RATE: 20 BRPM

## 2024-09-20 PROCEDURE — G0151 HHCP-SERV OF PT,EA 15 MIN: HCPCS | Mod: HHH

## 2024-09-20 SDOH — HEALTH STABILITY: PHYSICAL HEALTH: PHYSICAL EXERCISE: 10

## 2024-09-20 SDOH — HEALTH STABILITY: PHYSICAL HEALTH: EXERCISE ACTIVITY: ANKLE PUMPS, QUAD AND GLUT SETS

## 2024-09-20 SDOH — HEALTH STABILITY: PHYSICAL HEALTH: PHYSICAL EXERCISE: STANDING

## 2024-09-20 SDOH — HEALTH STABILITY: PHYSICAL HEALTH: EXERCISE ACTIVITY: ANKLE PUMPS, LAQ

## 2024-09-20 SDOH — HEALTH STABILITY: PHYSICAL HEALTH: PHYSICAL EXERCISE: SITTING

## 2024-09-20 SDOH — HEALTH STABILITY: PHYSICAL HEALTH: EXERCISE ACTIVITIES SETS: 1

## 2024-09-20 SDOH — HEALTH STABILITY: PHYSICAL HEALTH: EXERCISE ACTIVITY: TOE RAISES

## 2024-09-20 SDOH — HEALTH STABILITY: PHYSICAL HEALTH: PHYSICAL EXERCISE: SUPINE

## 2024-09-20 SDOH — HEALTH STABILITY: PHYSICAL HEALTH: EXERCISE TYPE: WHEP

## 2024-09-20 ASSESSMENT — ENCOUNTER SYMPTOMS
DEPRESSION: 0
APPETITE LEVEL: FAIR
PAIN SEVERITY GOAL: 0/10
PAIN LOCATION: LEFT LEG
HIGHEST PAIN SEVERITY IN PAST 24 HOURS: 2/10
OCCASIONAL FEELINGS OF UNSTEADINESS: 0
PERSON REPORTING PAIN: PATIENT
OCCASIONAL FEELINGS OF UNSTEADINESS: 0
PAIN: 1
SUBJECTIVE PAIN PROGRESSION: GRADUALLY IMPROVING
LOSS OF SENSATION IN FEET: 0
CHANGE IN APPETITE: UNCHANGED
LOWEST PAIN SEVERITY IN PAST 24 HOURS: 0/10

## 2024-09-20 ASSESSMENT — ACTIVITIES OF DAILY LIVING (ADL)
OASIS_M1830: 06
AMBULATION ASSISTANCE ON FLAT SURFACES: 1

## 2024-09-20 ASSESSMENT — BALANCE ASSESSMENTS
SITTING BALANCE: 1 - STEADY, SAFE
STANDING BALANCE: 2 - NARROW STANCE WITHOUT SUPPORT
SITTING DOWN: 1 - USES ARMS OR NOT SMOOTH MOTION
NUDGED: 2 - STEADY
NUDGED SCORE: 2
EYES CLOSED AT MAXIMUM POSITION NUDGED: 1 - STEADY
BALANCE SCORE: 14
ATTEMPTS TO ARISE: 2 - ABLE TO RISE, ONE ATTEMPT
ARISES: 1 - ABLE, USES ARMS TO HELP
ARISING SCORE: 1
TURNING 360 DEGREES STEPS: 1 - CONTINUOUS STEPS
IMMEDIATE STANDING BALANCE FIRST 5 SECONDS: 2 - STEADY WITHOUT WALKER OR OTHER SUPPORT

## 2024-09-20 ASSESSMENT — GAIT ASSESSMENTS
GAIT SCORE: 8
STEP SYMMETRY: 0 - RIGHT AND LEFT STEP LENGTH NOT EQUAL
PATH: 2 - STRAIGHT WITHOUT WALKING AID
PATH SCORE: 2
BALANCE AND GAIT SCORE: 22
INITIATION OF GAIT IMMEDIATELY AFTER GO: 1 - NO HESITANCY
TRUNK: 2 - NO SWAY, NO FLEXION, NO USE OF ARMS, NO WALKING AID
WALKING STANCE: 0 - HEELS APART
TRUNK SCORE: 2
STEP CONTINUITY: 1 - STEPS APPEAR CONTINUOUS

## 2024-09-20 NOTE — HOME HEALTH
SKILLED NURSING VISIT FOR ADMISSION TO HOMECARE SERVICES ASSESSMENT TEACHING OF MEDICATIONS DISEASE PROCESS AND TO CHANGE WOUND VAC DRESSING TO LLE. PT TOLERATED PROCEDURES WELL. PT S LLE REMAINS EDEMATOUS. PT ELEVATING LLE THROUGHOUT THE DAY AND MANAGING DISCOMFORT WITH ACETAMINOPHEN. PT HAS APPT WITH SURGEON NEXT WEEK ON THURSDAY 9/26/2024. FAMILY IS SUPPORTIVE AND INSTRUCTED IN CHANGING CANNISTER AND MAINTAINING WOUND VAC SEAL. PT ACCEPTS SN AND PHYSICAL THERPAY SERVICES.

## 2024-09-23 ENCOUNTER — HOME CARE VISIT (OUTPATIENT)
Dept: HOME HEALTH SERVICES | Facility: HOME HEALTH | Age: 64
End: 2024-09-23
Payer: MEDICARE

## 2024-09-23 VITALS
DIASTOLIC BLOOD PRESSURE: 60 MMHG | HEART RATE: 76 BPM | OXYGEN SATURATION: 97 % | SYSTOLIC BLOOD PRESSURE: 100 MMHG | RESPIRATION RATE: 18 BRPM | TEMPERATURE: 97.6 F

## 2024-09-23 LAB
FUNGUS SPEC CULT: NORMAL
FUNGUS SPEC CULT: NORMAL
FUNGUS SPEC FUNGUS STN: NORMAL
FUNGUS SPEC FUNGUS STN: NORMAL

## 2024-09-23 PROCEDURE — G0299 HHS/HOSPICE OF RN EA 15 MIN: HCPCS | Mod: HHH

## 2024-09-23 ASSESSMENT — ENCOUNTER SYMPTOMS
LOWEST PAIN SEVERITY IN PAST 24 HOURS: 0/10
BOWEL PATTERN NORMAL: 1
PERSON REPORTING PAIN: PATIENT
APPETITE LEVEL: GOOD
PAIN LOCATION: LEFT LEG
PAIN SEVERITY GOAL: 0/10
PAIN LOCATION - PAIN SEVERITY: 2/10
PAIN LOCATION - RELIEVING FACTORS: REST AND MEDICATIONS
CHANGE IN APPETITE: UNCHANGED
HIGHEST PAIN SEVERITY IN PAST 24 HOURS: 2/10
PAIN LOCATION - EXACERBATING FACTORS: ACTIVITY AND WOUND CARE
PAIN LOCATION - PAIN QUALITY: TENDER AND ACHY
STOOL FREQUENCY: DAILY
PAIN LOCATION - PAIN FREQUENCY: INTERMITTENT
SUBJECTIVE PAIN PROGRESSION: GRADUALLY IMPROVING
LAST BOWEL MOVEMENT: 67106
PAIN: 1
LOWER EXTREMITY EDEMA: 1

## 2024-09-23 ASSESSMENT — ACTIVITIES OF DAILY LIVING (ADL)
PREPARING MEALS: NEEDS ASSISTANCE
HOUSEKEEPING ASSESSED: 1
AMBULATION ASSISTANCE: STAND BY ASSIST
TRANSPORTATION ASSESSED: 1
LIGHT HOUSEKEEPING: NEEDS ASSISTANCE
AMBULATION ASSISTANCE: 1
TRANSPORTATION: NEEDS ASSISTANCE

## 2024-09-26 ENCOUNTER — HOSPITAL ENCOUNTER (OUTPATIENT)
Dept: RADIOLOGY | Facility: CLINIC | Age: 64
Discharge: HOME | End: 2024-09-26
Payer: MEDICARE

## 2024-09-26 ENCOUNTER — HOME CARE VISIT (OUTPATIENT)
Dept: HOME HEALTH SERVICES | Facility: HOME HEALTH | Age: 64
End: 2024-09-26
Payer: MEDICARE

## 2024-09-26 ENCOUNTER — HOSPITAL ENCOUNTER (OUTPATIENT)
Dept: RADIOLOGY | Facility: EXTERNAL LOCATION | Age: 64
Discharge: HOME | End: 2024-09-26

## 2024-09-26 ENCOUNTER — APPOINTMENT (OUTPATIENT)
Dept: ORTHOPEDIC SURGERY | Facility: CLINIC | Age: 64
End: 2024-09-26
Payer: MEDICARE

## 2024-09-26 DIAGNOSIS — L03.116 CELLULITIS AND ABSCESS OF LEFT LOWER EXTREMITY: ICD-10-CM

## 2024-09-26 DIAGNOSIS — L02.416 CELLULITIS AND ABSCESS OF LEFT LOWER EXTREMITY: ICD-10-CM

## 2024-09-26 PROCEDURE — 97605 NEG PRS WND THER DME<=50SQCM: CPT | Performed by: SPECIALIST

## 2024-09-26 PROCEDURE — 99214 OFFICE O/P EST MOD 30 MIN: CPT | Performed by: SPECIALIST

## 2024-09-26 PROCEDURE — 1111F DSCHRG MED/CURRENT MED MERGE: CPT | Performed by: SPECIALIST

## 2024-09-26 PROCEDURE — 1159F MED LIST DOCD IN RCRD: CPT | Performed by: SPECIALIST

## 2024-09-26 PROCEDURE — 73590 X-RAY EXAM OF LOWER LEG: CPT | Mod: LT

## 2024-09-26 PROCEDURE — 1036F TOBACCO NON-USER: CPT | Performed by: SPECIALIST

## 2024-09-26 PROCEDURE — 1160F RVW MEDS BY RX/DR IN RCRD: CPT | Performed by: SPECIALIST

## 2024-09-26 SDOH — HEALTH STABILITY: PHYSICAL HEALTH: EXERCISE ACTIVITY: SITTING ANKLE PUMPS, LAQ, HIP FLEX

## 2024-09-26 SDOH — HEALTH STABILITY: PHYSICAL HEALTH: PHYSICAL EXERCISE: SITTING

## 2024-09-26 SDOH — HEALTH STABILITY: PHYSICAL HEALTH: EXERCISE ACTIVITIES SETS: 1

## 2024-09-26 SDOH — HEALTH STABILITY: PHYSICAL HEALTH: EXERCISE TYPE: WHEP

## 2024-09-26 SDOH — HEALTH STABILITY: PHYSICAL HEALTH: PHYSICAL EXERCISE: STANDING

## 2024-09-26 SDOH — HEALTH STABILITY: PHYSICAL HEALTH: PHYSICAL EXERCISE: 10

## 2024-09-26 SDOH — HEALTH STABILITY: PHYSICAL HEALTH: EXERCISE ACTIVITY: STANDING TOE RAISES

## 2024-09-26 ASSESSMENT — ENCOUNTER SYMPTOMS
PAIN SEVERITY GOAL: 0/10
PAIN: 1
PERSON REPORTING PAIN: PATIENT
PAIN LOCATION: LEFT LEG
HIGHEST PAIN SEVERITY IN PAST 24 HOURS: 2/10
SUBJECTIVE PAIN PROGRESSION: GRADUALLY IMPROVING
LOWEST PAIN SEVERITY IN PAST 24 HOURS: 0/10

## 2024-09-26 ASSESSMENT — PAIN - FUNCTIONAL ASSESSMENT: PAIN_FUNCTIONAL_ASSESSMENT: NO/DENIES PAIN

## 2024-09-26 ASSESSMENT — ACTIVITIES OF DAILY LIVING (ADL)
AMBULATION_DISTANCE/DURATION_TOLERATED: 100 FEET X2
AMBULATION ASSISTANCE ON FLAT SURFACES: 1

## 2024-09-26 NOTE — DOCUMENTATION CLARIFICATION NOTE
"    PATIENT:               CECILIA PORRAS  ACCT #:                  [id number]  MRN:                       [id number]  :                       1960  ADMIT DATE:       2024 9:46 AM  DISCH DATE:        2024 8:10 PM  RESPONDING PROVIDER #:        [id number]          PROVIDER RESPONSE TEXT:    Excisional debridement down to and including subcutaneous tissue and fascia    CDI QUERY TEXT:    Clarification    Instruction:    Based on your assessment of the patient and the clinical information, please provide the requested documentation by clicking on the appropriate radio button and enter any additional information if prompted.    Question: Please further clarify the debridement procedure as    When answering this query, please exercise your independent professional judgment. The fact that a question is being asked, does not imply that any particular answer is desired or expected.    The patient's clinical indicators include:  Clinical Information: This query refers to the procedure performed on 24 and documented as lower extremity I and D    Op Note  \"4 cm incision was made in a linear fashion over this area.  As soon as the skin and subcutaneous tissue was opened the abscess cavity was entered and a brown purulence with phlegmon was aspirated\", \" A curette was used to remove any devitalized tissues\"  Options provided:  -- Excisional debridement to and including skin  -- Excisional debridement down to and including subcutaneous tissue and fascia  -- Non-excisional debridement to and including skin  -- Non-excisional debridement down to and including subcutaneous tissue and fascia  -- Other - I will add my own diagnosis  -- Refer to Clinical Documentation Reviewer    Query created by: Chantell Lopez on 2024 1:49 PM      Electronically signed by:  DEANA ACUÑA MD 2024 3:36 PM          "

## 2024-09-26 NOTE — CASE COMMUNICATION
Spoke to patient by phone and she declined further PT visits stating she is doing well at this time.  She will be discharged from PT with RN still active as of 9.26.24.Patient instructed in signs and symptoms of infection, when to call MD or 911, fall prevention, safe progression of WHEP, safety with household transfers and ambulation with assistive device.

## 2024-09-27 ENCOUNTER — HOME CARE VISIT (OUTPATIENT)
Dept: HOME HEALTH SERVICES | Facility: HOME HEALTH | Age: 64
End: 2024-09-27
Payer: MEDICARE

## 2024-09-27 VITALS
SYSTOLIC BLOOD PRESSURE: 102 MMHG | DIASTOLIC BLOOD PRESSURE: 64 MMHG | HEART RATE: 74 BPM | RESPIRATION RATE: 16 BRPM | OXYGEN SATURATION: 98 %

## 2024-09-27 PROCEDURE — G0299 HHS/HOSPICE OF RN EA 15 MIN: HCPCS | Mod: HHH

## 2024-09-27 ASSESSMENT — ENCOUNTER SYMPTOMS
CHANGE IN APPETITE: UNCHANGED
APPETITE LEVEL: GOOD

## 2024-09-27 NOTE — ASSESSMENT & PLAN NOTE
Left leg status post irrigation and debridement of a cellulitis and abscess.  2 weeks status post 9/12/2024 I&D surgery.    Plan:  The wound VAC was changed today.  Home health will change it 2 more times.  Follow-up in 1 week for reevaluation.  She continues her antibiotics p.o.  She can be activity as tolerated.

## 2024-09-27 NOTE — PROGRESS NOTES
Assessment/Plan   Encounter Diagnoses:  Cellulitis and abscess of left lower extremity  Cellulitis and abscess of left lower extremity  Left leg status post irrigation and debridement of a cellulitis and abscess.  2 weeks status post 9/12/2024 I&D surgery.    Plan:  The wound VAC was changed today.  Home health will change it 2 more times.  Follow-up in 1 week for reevaluation.  She continues her antibiotics p.o.  She can be activity as tolerated.       Subjective    Patient ID: Belgica Pinto is a 64 y.o. female.    Chief Complaint: Post-op of the Left Leg     Last Surgery: Lower Extremity I  and  D - Left  Last Surgery Date: 9/12/2024    HPI  2 weeks status post an I&D of the left leg cellulitis and abscess.  Her pain level is significantly reduced.  She is ambulating with minimal pain.  The wound VAC is functioning properly there is minimal accumulation in the collection chamber    OBJECTIVE: ORTHO EXAM  The wound VAC was removed.  The inferior aspect of the I&D incision is sealed and healing well.  The more proximal aspect still has some packing from the wound VAC sponge.  This is less than the last dressing change.  I was not able to express any fluid or purulence.  The skin overlying the cellulitic area is now normal colored no longer red.  There is no calor.  Gentle palpation is minimally to nontender.  She is neurovascularly intact distally.  Dorsiflexion against resistance was relatively nontender.  With 5/5 strength.  Plantarflexion against resistance had some soreness in the area of the surgical intervention but she had 5/5 strength.  Neurovascularly intact distally.  The calves were relatively nontender and negative Homans bilaterally.    IMAGE RESULTS:  XR tibia fibula left 2 views  Narrative: Interpreted By:  Eric Merino,   STUDY:  XR TIBIA FIBULA LEFT 2 VIEWS;  9/26/2024 8:49 am      INDICATION:  Signs/Symptoms:cellulitis of lower extremity.  ,L03.116 Cellulitis of left lower limb,L02.416  Cutaneous abscess of  left lower limb      COMPARISON:  Prior exam from 09/04/2024      ACCESSION NUMBER(S):  [id number]      ORDERING CLINICIAN:  DEANA ACUÑA      TECHNIQUE:  AP and lateral views  of the  left lower leg were obtained.      FINDINGS:  Sharpening of the tibial spines.  Ankle mortise and talar dome are  intact. No gross suprapatellar effusion. There is a catheter  overlying the proximal anterior aspect of the tibia. Mild soft tissue  edema in the proximal lower leg. No soft tissue gas density. No lytic  or blastic destructive bone lesion. No acute fracture or dislocation.  No opaque soft tissue foreign body.  No periosteal reaction or erosion.      Impression: Proximal lower leg soft tissue swelling. No soft tissue gas density.  No bone destruction or periostitis.      MACRO:  None      Signed by: Eric Merino 9/27/2024 9:22 AM  Dictation workstation:   [id number]      ULTRASOUND  None      Procedures     Orders Placed This Encounter    Point of Care Ultrasound    XR tibia fibula left 2 views

## 2024-09-30 ENCOUNTER — HOME CARE VISIT (OUTPATIENT)
Dept: HOME HEALTH SERVICES | Facility: HOME HEALTH | Age: 64
End: 2024-09-30
Payer: MEDICARE

## 2024-09-30 VITALS
DIASTOLIC BLOOD PRESSURE: 62 MMHG | SYSTOLIC BLOOD PRESSURE: 118 MMHG | HEART RATE: 60 BPM | RESPIRATION RATE: 18 BRPM | TEMPERATURE: 97.2 F | OXYGEN SATURATION: 97 %

## 2024-09-30 PROCEDURE — G0299 HHS/HOSPICE OF RN EA 15 MIN: HCPCS | Mod: HHH

## 2024-09-30 SDOH — ECONOMIC STABILITY: GENERAL

## 2024-09-30 ASSESSMENT — ACTIVITIES OF DAILY LIVING (ADL)
MONEY MANAGEMENT (EXPENSES/BILLS): INDEPENDENT
AMBULATION ASSISTANCE: 1

## 2024-09-30 ASSESSMENT — PAIN SCALES - PAIN ASSESSMENT IN ADVANCED DEMENTIA (PAINAD)
BODYLANGUAGE: 0
FACIALEXPRESSION: 0 - SMILING OR INEXPRESSIVE.
NEGVOCALIZATION: 0 - NONE.
BODYLANGUAGE: 0 - RELAXED.
FACIALEXPRESSION: 0
CONSOLABILITY: 0
TOTALSCORE: 0
BREATHING: 0
CONSOLABILITY: 0 - NO NEED TO CONSOLE.
NEGVOCALIZATION: 0

## 2024-09-30 ASSESSMENT — ENCOUNTER SYMPTOMS
LOSS OF SENSATION IN FEET: 0
BOWEL PATTERN NORMAL: 1
LAST BOWEL MOVEMENT: 67113
APPETITE LEVEL: GOOD
OCCASIONAL FEELINGS OF UNSTEADINESS: 0
DEPRESSION: 0
STOOL FREQUENCY: DAILY
DENIES PAIN: 1
PERSON REPORTING PAIN: PATIENT
CHANGE IN APPETITE: UNCHANGED

## 2024-10-01 ENCOUNTER — HOME CARE VISIT (OUTPATIENT)
Dept: HOME HEALTH SERVICES | Facility: HOME HEALTH | Age: 64
End: 2024-10-01
Payer: MEDICARE

## 2024-10-01 ASSESSMENT — PAIN SCALES - PAIN ASSESSMENT IN ADVANCED DEMENTIA (PAINAD)
BODYLANGUAGE: 0
TOTALSCORE: 0
FACIALEXPRESSION: 0
CONSOLABILITY: 0
NEGVOCALIZATION: 0 - NONE.
FACIALEXPRESSION: 0 - SMILING OR INEXPRESSIVE.
CONSOLABILITY: 0 - NO NEED TO CONSOLE.
BODYLANGUAGE: 0 - RELAXED.
BREATHING: 0
NEGVOCALIZATION: 0

## 2024-10-01 ASSESSMENT — ENCOUNTER SYMPTOMS
PERSON REPORTING PAIN: PATIENT
DENIES PAIN: 1

## 2024-10-01 ASSESSMENT — ACTIVITIES OF DAILY LIVING (ADL)
HOME_HEALTH_OASIS: 02
OASIS_M1830: 01

## 2024-10-04 ENCOUNTER — APPOINTMENT (OUTPATIENT)
Dept: ORTHOPEDIC SURGERY | Facility: CLINIC | Age: 64
End: 2024-10-04
Payer: COMMERCIAL

## 2024-10-04 DIAGNOSIS — L02.416 CELLULITIS AND ABSCESS OF LEFT LOWER EXTREMITY: Primary | ICD-10-CM

## 2024-10-04 DIAGNOSIS — L03.116 CELLULITIS AND ABSCESS OF LEFT LOWER EXTREMITY: Primary | ICD-10-CM

## 2024-10-04 PROCEDURE — 1036F TOBACCO NON-USER: CPT | Performed by: SPECIALIST

## 2024-10-04 PROCEDURE — 99214 OFFICE O/P EST MOD 30 MIN: CPT | Performed by: SPECIALIST

## 2024-10-04 PROCEDURE — 1111F DSCHRG MED/CURRENT MED MERGE: CPT | Performed by: SPECIALIST

## 2024-10-04 PROCEDURE — 1159F MED LIST DOCD IN RCRD: CPT | Performed by: SPECIALIST

## 2024-10-04 PROCEDURE — 1160F RVW MEDS BY RX/DR IN RCRD: CPT | Performed by: SPECIALIST

## 2024-10-04 ASSESSMENT — PAIN - FUNCTIONAL ASSESSMENT: PAIN_FUNCTIONAL_ASSESSMENT: NO/DENIES PAIN

## 2024-10-04 NOTE — ASSESSMENT & PLAN NOTE
Left leg status post irrigation and debridement of a cellulitis and abscess.  3 weeks and 1 day status post 9/12/2024 I&D surgery.    Plan:  The wound VAC was discontinued today   She will start on daily dressing changes.  I reviewed these with her and she understands what is required.    Follow-up in 2 weeks for reevaluation.  She can be activity as tolerated.

## 2024-10-04 NOTE — PROGRESS NOTES
Assessment/Plan   Encounter Diagnoses:  No diagnosis found.  Cellulitis and abscess of left lower extremity  Left leg status post irrigation and debridement of a cellulitis and abscess.  3 weeks and 1 day status post 9/12/2024 I&D surgery.    Plan:  The wound VAC was discontinued today   She will start on daily dressing changes.  I reviewed these with her and she understands what is required.    Follow-up in 2 weeks for reevaluation.  She can be activity as tolerated.       Subjective    Patient ID: Belgica Pinto is a 64 y.o. female.    Chief Complaint: Post-op of the Left Leg (DOI 8-4-24/GOT CELLULITIS ON 9-4-24/ADMITTED TO [facility] ED 9-11-24/HAVING WOUND CHECKS WEEKLY)     Last Surgery: Lower Extremity I  and  D - Left  Last Surgery Date: 9/12/2024    HPI  64-year-old who is healing from the irrigation and debridement of a left leg abscess from cellulitis.  She is having almost no drainage.  The wound VAC has been on for the last 3 weeks and she is tolerating this well.      OBJECTIVE: ORTHO EXAM  Left tibia and leg exam  The incision is healing well with nearly complete apposition in the superior two thirds.  The inferior one third has had some sponge packing but has an excellent granulation bed.  There is no signs of erythema, calor, or pain.  There is no purulence or drainage.  The base of the wound has sealed and no longer is an entrance into the previous abscess cavity.    IMAGE RESULTS:  XR tibia fibula left 2 views  Narrative: Interpreted By:  Eric Merino,   STUDY:  XR TIBIA FIBULA LEFT 2 VIEWS;  9/26/2024 8:49 am      INDICATION:  Signs/Symptoms:cellulitis of lower extremity.  ,L03.116 Cellulitis of left lower limb,L02.416 Cutaneous abscess of  left lower limb      COMPARISON:  Prior exam from 09/04/2024      ACCESSION NUMBER(S):  [id number]      ORDERING CLINICIAN:  DEANA ACUÑA      TECHNIQUE:  AP and lateral views  of the  left lower leg were obtained.      FINDINGS:  Sharpening of the tibial spines.   Ankle mortise and talar dome are  intact. No gross suprapatellar effusion. There is a catheter  overlying the proximal anterior aspect of the tibia. Mild soft tissue  edema in the proximal lower leg. No soft tissue gas density. No lytic  or blastic destructive bone lesion. No acute fracture or dislocation.  No opaque soft tissue foreign body.  No periosteal reaction or erosion.      Impression: Proximal lower leg soft tissue swelling. No soft tissue gas density.  No bone destruction or periostitis.      MACRO:  None      Signed by: Eric Merino 9/27/2024 9:22 AM  Dictation workstation:   [id number]      ULTRASOUND  None    Procedures     No orders of the defined types were placed in this encounter.

## 2024-10-09 ENCOUNTER — HOME CARE VISIT (OUTPATIENT)
Dept: HOME HEALTH SERVICES | Facility: HOME HEALTH | Age: 64
End: 2024-10-09
Payer: COMMERCIAL

## 2024-10-09 VITALS
OXYGEN SATURATION: 96 % | TEMPERATURE: 97.6 F | DIASTOLIC BLOOD PRESSURE: 74 MMHG | RESPIRATION RATE: 12 BRPM | SYSTOLIC BLOOD PRESSURE: 120 MMHG | HEART RATE: 67 BPM

## 2024-10-09 PROCEDURE — G0299 HHS/HOSPICE OF RN EA 15 MIN: HCPCS

## 2024-10-09 PROCEDURE — G0299 HHS/HOSPICE OF RN EA 15 MIN: HCPCS | Mod: HHH

## 2024-10-09 ASSESSMENT — ENCOUNTER SYMPTOMS: APPETITE LEVEL: GOOD

## 2024-10-10 ASSESSMENT — PAIN SCALES - PAIN ASSESSMENT IN ADVANCED DEMENTIA (PAINAD)
BREATHING: 0
NEGVOCALIZATION: 0
NEGVOCALIZATION: 0 - NONE.
FACIALEXPRESSION: 0
BODYLANGUAGE: 0
BODYLANGUAGE: 0 - RELAXED.
CONSOLABILITY: 0 - NO NEED TO CONSOLE.
TOTALSCORE: 0
CONSOLABILITY: 0
FACIALEXPRESSION: 0 - SMILING OR INEXPRESSIVE.

## 2024-10-10 ASSESSMENT — ENCOUNTER SYMPTOMS
HIGHEST PAIN SEVERITY IN PAST 24 HOURS: 0/10
DENIES PAIN: 1
PERSON REPORTING PAIN: PATIENT
LOWEST PAIN SEVERITY IN PAST 24 HOURS: 0/10
PAIN SEVERITY GOAL: 0/10

## 2024-10-10 ASSESSMENT — ACTIVITIES OF DAILY LIVING (ADL)
HOME_HEALTH_OASIS: 00
OASIS_M1830: 00

## 2024-10-11 ASSESSMENT — ACTIVITIES OF DAILY LIVING (ADL)
HOME_HEALTH_OASIS: 00
OASIS_M1830: 02
OASIS_M1830: 00
ENTERING_EXITING_HOME: STAND BY ASSIST

## 2024-10-11 ASSESSMENT — ENCOUNTER SYMPTOMS
DENIES PAIN: 1
PERSON REPORTING PAIN: PATIENT
DENIES PAIN: 1

## 2024-10-11 ASSESSMENT — PAIN SCALES - PAIN ASSESSMENT IN ADVANCED DEMENTIA (PAINAD)
FACIALEXPRESSION: 0 - SMILING OR INEXPRESSIVE.
BREATHING: 0
FACIALEXPRESSION: 0
NEGVOCALIZATION: 0 - NONE.
TOTALSCORE: 0
BODYLANGUAGE: 0
CONSOLABILITY: 0
CONSOLABILITY: 0 - NO NEED TO CONSOLE.
BODYLANGUAGE: 0 - RELAXED.
NEGVOCALIZATION: 0

## 2024-10-14 ENCOUNTER — HOME CARE VISIT (OUTPATIENT)
Dept: HOME HEALTH SERVICES | Facility: HOME HEALTH | Age: 64
End: 2024-10-14
Payer: COMMERCIAL

## 2024-10-16 ENCOUNTER — HOME CARE VISIT (OUTPATIENT)
Dept: HOME HEALTH SERVICES | Facility: HOME HEALTH | Age: 64
End: 2024-10-16
Payer: COMMERCIAL

## 2024-10-16 VITALS
DIASTOLIC BLOOD PRESSURE: 78 MMHG | TEMPERATURE: 97.2 F | RESPIRATION RATE: 16 BRPM | SYSTOLIC BLOOD PRESSURE: 122 MMHG | OXYGEN SATURATION: 99 % | HEART RATE: 68 BPM

## 2024-10-16 PROCEDURE — G0299 HHS/HOSPICE OF RN EA 15 MIN: HCPCS

## 2024-10-16 ASSESSMENT — ENCOUNTER SYMPTOMS
APPETITE LEVEL: GOOD
PERSON REPORTING PAIN: PATIENT
CHANGE IN APPETITE: UNCHANGED
DENIES PAIN: 1

## 2024-10-22 ENCOUNTER — HOSPITAL ENCOUNTER (OUTPATIENT)
Dept: RADIOLOGY | Facility: EXTERNAL LOCATION | Age: 64
Discharge: HOME | End: 2024-10-22

## 2024-10-22 ENCOUNTER — APPOINTMENT (OUTPATIENT)
Dept: ORTHOPEDIC SURGERY | Facility: CLINIC | Age: 64
End: 2024-10-22
Payer: COMMERCIAL

## 2024-10-22 DIAGNOSIS — L03.116 CELLULITIS AND ABSCESS OF LEFT LOWER EXTREMITY: ICD-10-CM

## 2024-10-22 DIAGNOSIS — L02.416 CELLULITIS AND ABSCESS OF LEFT LOWER EXTREMITY: ICD-10-CM

## 2024-10-22 PROCEDURE — 1036F TOBACCO NON-USER: CPT | Performed by: SPECIALIST

## 2024-10-22 PROCEDURE — 1159F MED LIST DOCD IN RCRD: CPT | Performed by: SPECIALIST

## 2024-10-22 PROCEDURE — 99213 OFFICE O/P EST LOW 20 MIN: CPT | Performed by: SPECIALIST

## 2024-10-22 PROCEDURE — 1160F RVW MEDS BY RX/DR IN RCRD: CPT | Performed by: SPECIALIST

## 2024-10-22 ASSESSMENT — PAIN - FUNCTIONAL ASSESSMENT: PAIN_FUNCTIONAL_ASSESSMENT: NO/DENIES PAIN

## 2024-10-22 NOTE — PROGRESS NOTES
Assessment/Plan   Encounter Diagnoses:  Cellulitis and abscess of left lower extremity  Cellulitis and abscess of left lower extremity  Left leg status post irrigation and debridement of a cellulitis and abscess.  5 weeks and 5 days status post 9/12/2024 I&D surgery.  She states that she has not had drainage in the last 24 hours and she barely had any for the last 2 or 3 days.    Plan:  Dry sterile dressing changes with a Band-Aid.  If her wound is clean and dry she does not even need to Band-Aid.  Follow-up in 4 to 6 weeks for a clinical recheck.       Subjective    Patient ID: Belgica Pinto is a 64 y.o. female.    Chief Complaint: Post-op of the Left Leg (DOI 8-4-24/GOT CELLULITIS ON 9-4-24/ADMITTED TO [facility] ED 9-11-24/HAVING WOUND CHECKS WEEKLY/Finished wound vac)     Last Surgery: Lower Extremity I  and  D - Left  Last Surgery Date: 9/12/2024    HPI  5 weeks and 5 days status post I&D of a left leg cellulitis with abscess.  Patient is doing exceptionally well.  She is not having any pain or disability.  About 24 hours ago the inferior aspect of the incision closed and she has a eschar there.  Prior to that she was having minimal clear drainage.    OBJECTIVE: ORTHO EXAM  Left leg.  The incision is clean and dry.  There is a 1 x 3 mm eschar in the inferior aspect where she previously had some clear drainage.  She stopped packing the wound about 3 or 4 days ago and its progressively been closing and healing properly.  Her calf is supple and nontender bilaterally.  Her proximal tibial area specially medially is supple and completely nontender.  No redness or warmth.  She is ambulating full weightbearing without an antalgic limp.  He is neurovascularly intact distally.      IMAGE RESULTS:  Point of Care Ultrasound  These images are not reportable by radiology and will not be interpreted   by  Radiologists.      ULTRASOUND  None    Procedures     Orders Placed This Encounter    Point of Care Ultrasound

## 2024-10-22 NOTE — ASSESSMENT & PLAN NOTE
Left leg status post irrigation and debridement of a cellulitis and abscess.  5 weeks and 5 days status post 9/12/2024 I&D surgery.  She states that she has not had drainage in the last 24 hours and she barely had any for the last 2 or 3 days.    Plan:  Dry sterile dressing changes with a Band-Aid.  If her wound is clean and dry she does not even need to Band-Aid.  Follow-up in 4 to 6 weeks for a clinical recheck.

## 2024-10-24 ENCOUNTER — HOME CARE VISIT (OUTPATIENT)
Dept: HOME HEALTH SERVICES | Facility: HOME HEALTH | Age: 64
End: 2024-10-24
Payer: COMMERCIAL

## 2024-10-24 VITALS
OXYGEN SATURATION: 99 % | DIASTOLIC BLOOD PRESSURE: 62 MMHG | SYSTOLIC BLOOD PRESSURE: 124 MMHG | RESPIRATION RATE: 18 BRPM

## 2024-10-24 PROCEDURE — G0299 HHS/HOSPICE OF RN EA 15 MIN: HCPCS

## 2024-10-24 ASSESSMENT — ACTIVITIES OF DAILY LIVING (ADL)
HOME_HEALTH_OASIS: 00
OASIS_M1830: 00

## 2024-11-18 ENCOUNTER — OFFICE VISIT (OUTPATIENT)
Dept: URGENT CARE | Facility: CLINIC | Age: 64
End: 2024-11-18
Payer: COMMERCIAL

## 2024-11-18 VITALS
RESPIRATION RATE: 18 BRPM | WEIGHT: 165 LBS | TEMPERATURE: 97.4 F | BODY MASS INDEX: 31.15 KG/M2 | SYSTOLIC BLOOD PRESSURE: 151 MMHG | HEIGHT: 61 IN | HEART RATE: 63 BPM | DIASTOLIC BLOOD PRESSURE: 71 MMHG | OXYGEN SATURATION: 98 %

## 2024-11-18 DIAGNOSIS — S61.411A LACERATION OF RIGHT HAND WITHOUT FOREIGN BODY, INITIAL ENCOUNTER: Primary | ICD-10-CM

## 2024-11-18 PROCEDURE — 99213 OFFICE O/P EST LOW 20 MIN: CPT | Performed by: NURSE PRACTITIONER

## 2024-11-18 RX ORDER — DOXYCYCLINE 100 MG/1
100 TABLET ORAL 2 TIMES DAILY
Qty: 14 TABLET | Refills: 0 | Status: SHIPPED | OUTPATIENT
Start: 2024-11-18 | End: 2024-11-25

## 2024-11-18 NOTE — PROGRESS NOTES
64 y.o. female presents for evaluation of laceration/skin tear to right hand that occurred 2 hours ago when she was putting up 10 on the barn.  States she is date on her tetanus.  States she was recent hospitalized with MRSA on her leg and is concerned that she needs to start on antibiotics to prevent infection in his laceration.  Denies any numbness or tingling, blood thinner use, active bleeding or any other associated symptom complaint.      Vitals:    11/18/24 1719   BP: 151/71   Pulse: 63   Resp: 18   Temp: 36.3 °C (97.4 °F)   SpO2: 98%       No Known Allergies    Medication Documentation Review Audit       Reviewed by Nayely Lopez MA (Medical Assistant) on 11/18/24 at 1718      Medication Order Taking? Sig Documenting Provider Last Dose Status   acetaminophen (Tylenol) 325 mg tablet [id number] Yes Take 2 tablets (650 mg) by mouth every 4 hours if needed for mild pain (1 - 3). Historical Provider, MD Taking Active   ibuprofen (AdviL) 200 mg tablet [id number] Yes Take 1 tablet (200 mg) by mouth every 8 hours if needed for moderate pain (4 - 6). as needed for mod pain 4-6 Ramone Cedeño MD Taking Active   multivitamin tablet [id number] Yes Take 1 tablet by mouth once daily. Historical Provider, MD Taking Active                    No past medical history on file.    No past surgical history on file.    ROS  See HPI    Physical Exam  Vitals and nursing note reviewed.   Constitutional:       Appearance: Normal appearance.   Skin:     General: Skin is warm.      Findings: Laceration (2 cm U-shaped skin tear/laceration to right hand with well-approximated edges and no active bleeding, no significant erythema, edema or drainage.) present.   Neurological:      General: No focal deficit present.      Mental Status: She is alert and oriented to person, place, and time.   Psychiatric:         Mood and Affect: Mood normal.         Behavior: Behavior normal.           Assessment/Plan/MDM  Belgica was seen today for  laceration.  Diagnoses and all orders for this visit:  Laceration of right hand without foreign body, initial encounter (Primary)  -     doxycycline (Adoxa) 100 mg tablet; Take 1 tablet (100 mg) by mouth 2 times a day for 7 days. Take with a full glass of water and do not lie down for at least 30 minutes after    Discussed wound care with patient and worsening signs and symptoms.  Patient's clinical presentation is otherwise unremarkable at this time. Patient is discharged with instructions to follow-up with primary care or seek emergency medical attention for worsening symptoms or any new concerns.    I did personally review Belgica's past medical history, surgical history, social history, as well as family history (when relevant).  In this case, I also oversaw the her drug management by reviewing her medication list, allergy list, as well as the medications that I prescribed during the UC course and/or recommended as an out-patient (including possible OTC medications such as acetaminophen, NSAIDs , etc).    After reviewing the items above, I did look at previous medical documentation, such as recent hospitalizations, office visits, and/or recent consultations with PCP/specialist.                          SDOH:   Another factor that I considered in Belgica's care was her Social Determinants of Health (SDOH). During this UC encounter, she did not have social determinants of health. Those SDOH influencing Belgica's care are: none      Kirill Centeno CNP  [facility]  [phone]

## 2024-12-03 ENCOUNTER — APPOINTMENT (OUTPATIENT)
Dept: ORTHOPEDIC SURGERY | Facility: CLINIC | Age: 64
End: 2024-12-03
Payer: COMMERCIAL

## 2024-12-03 DIAGNOSIS — L02.416 CELLULITIS AND ABSCESS OF LEFT LOWER EXTREMITY: Primary | ICD-10-CM

## 2024-12-03 DIAGNOSIS — L03.116 CELLULITIS AND ABSCESS OF LEFT LOWER EXTREMITY: Primary | ICD-10-CM

## 2024-12-03 PROCEDURE — 1159F MED LIST DOCD IN RCRD: CPT | Performed by: SPECIALIST

## 2024-12-03 PROCEDURE — 1160F RVW MEDS BY RX/DR IN RCRD: CPT | Performed by: SPECIALIST

## 2024-12-03 PROCEDURE — 1036F TOBACCO NON-USER: CPT | Performed by: SPECIALIST

## 2024-12-03 PROCEDURE — 99213 OFFICE O/P EST LOW 20 MIN: CPT | Performed by: SPECIALIST

## 2024-12-03 ASSESSMENT — PAIN - FUNCTIONAL ASSESSMENT: PAIN_FUNCTIONAL_ASSESSMENT: NO/DENIES PAIN

## 2024-12-03 NOTE — ASSESSMENT & PLAN NOTE
Left leg status post irrigation and debridement of a cellulitis and abscess.  11 weeks and 5 days status post 9/12/2024 I&D surgery.  She states that she is doing well with no pain and a full return to her activities of daily living.      Plan:  Follow-up on a as needed basis.

## 2024-12-03 NOTE — PROGRESS NOTES
Assessment/Plan   Encounter Diagnoses:  No diagnosis found.  Cellulitis and abscess of left lower extremity  Left leg status post irrigation and debridement of a cellulitis and abscess.  11 weeks and 5 days status post 9/12/2024 I&D surgery.  She states that she is doing well with no pain and a full return to her activities of daily living.      Plan:  Follow-up on a as needed basis.       Subjective    Patient ID: Belgica Pinto is a 64 y.o. female.    Chief Complaint: Post-op of the Left Leg (DOI 8-4-24  SX DATE 9-11-24 /GOT CELLULITIS ON 9-4-24/ADMITTED TO [facility] ED 9-11-24/HAVING WOUND CHECKS WEEKLY/Finished wound vac)     Last Surgery: Lower Extremity I  and  D - Left  Last Surgery Date: 9/12/2024    HPI  64-year-old who is 11 weeks and 5 days status post I&D of her left leg for abscess and cellulitis.  She has recovered quite well.  She has no pain.  She is returned to activities of daily living without any restriction.    OBJECTIVE: ORTHO EXAM  Left leg exam  Her incision is clean and dry and well-healed.  There is some slight keloiding.  The medial leg is supple and nontender.  Her ambulation is within normal limits.  She has full range of motion of the knee and ankle.  Her calf is supple and nontender.  The superior medial aspect of the leg is completely supple and nontender.  She is neurovascularly intact distally.    IMAGE RESULTS:  Point of Care Ultrasound  These images are not reportable by radiology and will not be interpreted   by  Radiologists.      ULTRASOUND  None    Procedures     No orders of the defined types were placed in this encounter.

## 2025-09-02 ENCOUNTER — APPOINTMENT (OUTPATIENT)
Age: 65
End: 2025-09-02
Payer: COMMERCIAL

## 2025-09-17 ENCOUNTER — APPOINTMENT (OUTPATIENT)
Age: 65
End: 2025-09-17
Payer: COMMERCIAL

## (undated) DEVICE — HIGH FLOW TIP FOR INTERPULSE HANDPIECE SET

## (undated) DEVICE — STOCKINETTE, IMPERVIOUS, 9 X 44 IN, STERILE

## (undated) DEVICE — STAPLER, SKIN, ROTATING HEAD, PROXIMATE, WIDE, 35

## (undated) DEVICE — APPLICATOR, CHLORAPREP, W/ORANGE TINT, 26ML

## (undated) DEVICE — CATHETER TRAY, SURESTEP, 16FR, URINE METER W/STATLOCK

## (undated) DEVICE — GLOVE, PROTEXIS PI CLASSIC, SZ-8.0, PF, PF, LF

## (undated) DEVICE — IRRIGATION SET, TUR BLADDER, Y-TYPE

## (undated) DEVICE — TOWEL, OR, XRAY DECTECTABLE, 17 X 27, BLUE, STERILE

## (undated) DEVICE — GLOVE, SURGICAL, PROTEXIS PI BLUE W/NEUTHERA, 8.5, PF, LF

## (undated) DEVICE — COLLECTION/DELIVERY SYSTEM, COPAN ESWAB, REG SIZE SWAB

## (undated) DEVICE — BANDAGE, COFLEX, 4 X 5 YDS, TAN, STERILE, LF

## (undated) DEVICE — DRESSING, ABDOMINAL, TENDERSORB, 8 X 7-1/2 IN, STERILE

## (undated) DEVICE — SPONGE, LAP, XRAY DECT, 18IN X 18IN, W/MASTER DMT, STERILE

## (undated) DEVICE — Device

## (undated) DEVICE — TUBING, IRRIGATION, HIGH FLOW, HAND PIECE SET

## (undated) DEVICE — CUFF, TOURNIQUET, 34 X 4, DUAL PORT/SNGL BLADDER, DISP, LF

## (undated) DEVICE — STRAP, KNEE AND BODY, SINGLE USE

## (undated) DEVICE — GOWN, STANDARD, ULTRA, XX-LARGE

## (undated) DEVICE — STRAP, ARMBOARD, 3IN, BLUE/WHITE, SECURE HOOK

## (undated) DEVICE — DRESSING, GAUZE, SPONGE, 12 PLY, CURITY, 4 X 4 IN, RIGID TRAY, STERILE

## (undated) DEVICE — BANDAGE, GAUZE, CONFORMING, KERLIX, 6 PLY, 4.5 IN X 4.1 YD

## (undated) DEVICE — DRAPE, SHEET, 17 X 23 IN

## (undated) DEVICE — DRESSING, GAUZE, PETROLATUM, PATCH, XEROFORM, 1 X 8 IN, STERILE

## (undated) DEVICE — CIRCUIT, BREATHING, ADULT, B/V FILTER, HMEF, 3 L BAG, 108 IN